# Patient Record
Sex: MALE | Race: WHITE | NOT HISPANIC OR LATINO | Employment: FULL TIME | URBAN - METROPOLITAN AREA
[De-identification: names, ages, dates, MRNs, and addresses within clinical notes are randomized per-mention and may not be internally consistent; named-entity substitution may affect disease eponyms.]

---

## 2018-02-06 ENCOUNTER — OFFICE VISIT (OUTPATIENT)
Dept: MEDICAL GROUP | Facility: MEDICAL CENTER | Age: 43
End: 2018-02-06
Payer: COMMERCIAL

## 2018-02-06 VITALS
SYSTOLIC BLOOD PRESSURE: 120 MMHG | TEMPERATURE: 98 F | HEIGHT: 70 IN | OXYGEN SATURATION: 97 % | BODY MASS INDEX: 28.78 KG/M2 | WEIGHT: 201.06 LBS | DIASTOLIC BLOOD PRESSURE: 78 MMHG | HEART RATE: 83 BPM | RESPIRATION RATE: 16 BRPM

## 2018-02-06 DIAGNOSIS — G89.29 CHRONIC BILATERAL LOW BACK PAIN WITHOUT SCIATICA: ICD-10-CM

## 2018-02-06 DIAGNOSIS — K21.9 GASTROESOPHAGEAL REFLUX DISEASE, ESOPHAGITIS PRESENCE NOT SPECIFIED: ICD-10-CM

## 2018-02-06 DIAGNOSIS — Z76.89 ENCOUNTER TO ESTABLISH CARE: ICD-10-CM

## 2018-02-06 DIAGNOSIS — G89.29 CHRONIC NECK PAIN: ICD-10-CM

## 2018-02-06 DIAGNOSIS — F41.9 ANXIETY: ICD-10-CM

## 2018-02-06 DIAGNOSIS — D22.9 ATYPICAL NEVUS: ICD-10-CM

## 2018-02-06 DIAGNOSIS — M54.50 CHRONIC BILATERAL LOW BACK PAIN WITHOUT SCIATICA: ICD-10-CM

## 2018-02-06 DIAGNOSIS — M54.2 CHRONIC NECK PAIN: ICD-10-CM

## 2018-02-06 PROCEDURE — 99204 OFFICE O/P NEW MOD 45 MIN: CPT | Performed by: PHYSICIAN ASSISTANT

## 2018-02-06 RX ORDER — ALPRAZOLAM 0.25 MG/1
0.25 TABLET ORAL NIGHTLY PRN
COMMUNITY
End: 2019-01-29 | Stop reason: SDUPTHER

## 2018-02-06 ASSESSMENT — PATIENT HEALTH QUESTIONNAIRE - PHQ9: CLINICAL INTERPRETATION OF PHQ2 SCORE: 0
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
## 2018-02-06 NOTE — ASSESSMENT & PLAN NOTE
Also from the [facility] developed low back pain. No sciatica. States he takes ibuprofen almost daily for symptoms. Like to be seen by a chiropractor in the future.

## 2018-02-06 NOTE — ASSESSMENT & PLAN NOTE
Has intermittent anxiety. Takes alprazolam 0.25 mg as needed. Does not need any medication renewal today. Has had a chronic history of anxiety.
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
## 2018-02-06 NOTE — ASSESSMENT & PLAN NOTE
This is a 42-year-old male who is here today to establish care and also to discuss among other things a suspicious lesion on his neck. On the left side. He doesn't know how long it's been there. He was informed last time by his PCP that he needed to see a dermatologist but was never offered the referral. He was also addressed at his work as he works for [employer] by a doctor told him to have it evaluated. He is no history of melanoma. No family history of melanoma.

## 2018-02-06 NOTE — ASSESSMENT & PLAN NOTE
For the fast 6 months has had reflux symptoms symptoms are worse at nighttime. He eats a large lunch but a small dinner. Does like spicy foods. Has not made significant lifestyle changes over the past few months to curb the reflux. He will take over-the-counter ranitidine for symptoms. It is effective.

## 2018-02-09 ENCOUNTER — OFFICE VISIT (OUTPATIENT)
Dept: DERMATOLOGY | Facility: IMAGING CENTER | Age: 43
End: 2018-02-09
Payer: COMMERCIAL

## 2018-02-09 ENCOUNTER — HOSPITAL ENCOUNTER (OUTPATIENT)
Facility: MEDICAL CENTER | Age: 43
End: 2018-02-09
Attending: NURSE PRACTITIONER
Payer: COMMERCIAL

## 2018-02-09 DIAGNOSIS — D48.5 NEOPLASM OF UNCERTAIN BEHAVIOR OF SKIN OF NECK: ICD-10-CM

## 2018-02-09 PROCEDURE — 88305 TISSUE EXAM BY PATHOLOGIST: CPT

## 2018-02-09 PROCEDURE — 11100 PR BIOPSY OF SKIN LESION: CPT | Performed by: NURSE PRACTITIONER

## 2018-02-09 NOTE — PROGRESS NOTES
Chief Complaint   Patient presents with   • Skin Lesion         HISTORY OF THE PRESENT ILLNESS: Patient is a 42 y.o. male. His primary care provider is Bello Denise. This pleasant patient is here today regarding a skin lesion on his neck.      Neoplasm of uncertain behavior of skin of neck  He has had a pigmented lesion on his neck for at least 8 months. It was noticed by someone else. 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 His primary doctor has been concerned.       Allergies: Patient has no known allergies.    Current Outpatient Prescriptions   Medication Sig Dispense Refill   • ALPRAZolam (XANAX) 0.25 MG Tab Take 0.25 mg by mouth at bedtime as needed for Sleep.       No current facility-administered medications for this visit.        Past Medical History:   Diagnosis Date   • ASTHMA    • Insomnia    • Neoplasm of uncertain behavior of skin of neck 2/9/2018       Past Surgical History:   Procedure Laterality Date   • MANDIBLE REDUCTION CLOSED         Social History   Substance Use Topics   • Smoking status: Never Smoker   • Smokeless tobacco: Never Used   • Alcohol use 1.0 oz/week     2 Cans of beer per week      Comment: 2 drinks per day       Family Status   Relation Status   • Mother Alive   • Father Alive     Family History   Problem Relation Age of Onset   • Hyperlipidemia Father    • Hypertension Father    • Heart Disease Father        Review of Systems   Constitutional: Negative for fever, chills, weight loss and malaise/fatigue.   Skin: Skin lesion on his neck  Exam: There were no vitals taken for this visit.  General: Normal appearing. No distress.  Skin: Left lateral neck, 3-4 mm dark brown lesion with light brown halo surrounding.  Patient consents to biopsy.  Consent is signed     Preoperative diagnosis: Neoplasm of uncertain behavior skin of the neck  Postoperative diagnosis: Neoplasm of uncertain behavior skin of the neck          Anesthesia: Half to 1 cc of lidocaine with epinephrine    EBL: Less than 2  cc    Complications: None    Procedure: After informed consent patient was placed on the table supine. An area 2 cm superior lateral to the skin neoplasm was swabbed with iodine. Using a 30 gauge 1/2 inch needle local anesthesia was obtained with half cc of lidocaine with epinephrine.  Using a #3 punch a punch biopsy was performed, lesion was sent to pathology. The number four Prolene suture was placed.   Patient tolerated the procedure well . Antibacterial ointment and a sterile dressing was applied, patient was instructed on wound care. Patient was instructed on signs and symptoms of infection. He will call the office if these occur.          Please note that this dictation was created using voice recognition software. I have made every reasonable attempt to correct obvious errors, but I expect that there are errors of grammar and possibly content that I did not discover before finalizing the note.      Assessment/Plan  1. Neoplasm of uncertain behavior of skin of neck  Further treatment based on pathology results

## 2018-02-09 NOTE — ASSESSMENT & PLAN NOTE
He has had a pigmented lesion on his neck for at least 8 months. It was noticed by someone else. His primary doctor has been concerned.

## 2018-05-08 ENCOUNTER — APPOINTMENT (OUTPATIENT)
Dept: MEDICAL GROUP | Facility: MEDICAL CENTER | Age: 43
End: 2018-05-08

## 2018-05-22 ENCOUNTER — OFFICE VISIT (OUTPATIENT)
Dept: URGENT CARE | Facility: CLINIC | Age: 43
End: 2018-05-22

## 2018-05-22 ENCOUNTER — NON-PROVIDER VISIT (OUTPATIENT)
Dept: URGENT CARE | Facility: CLINIC | Age: 43
End: 2018-05-22

## 2018-05-22 DIAGNOSIS — Z02.1 PRE-EMPLOYMENT EXAMINATION: ICD-10-CM

## 2018-05-22 PROCEDURE — 8915 PR COMPREHENSIVE PHYSICAL: Performed by: NURSE PRACTITIONER

## 2018-05-22 PROCEDURE — 97750 PHYSICAL PERFORMANCE TEST: CPT | Performed by: NURSE PRACTITIONER

## 2018-05-22 ASSESSMENT — ENCOUNTER SYMPTOMS
BACK PAIN: 0
FEVER: 0
WHEEZING: 0
HEMOPTYSIS: 0
DIAPHORESIS: 0
FALLS: 0
NECK PAIN: 0
COUGH: 0
DIZZINESS: 0
CHILLS: 0
PALPITATIONS: 0
WEAKNESS: 0
SHORTNESS OF BREATH: 0
ORTHOPNEA: 0
MYALGIAS: 0
SPUTUM PRODUCTION: 0

## 2018-05-22 NOTE — PROGRESS NOTES
Subjective:      Genaro Kunz is a 43 y.o. male who presents with No chief complaint on file.            Patient comes in today for a pre-employment physical.  He reports he is in general good health and does not anticipate any difficulty performing expected job duties.  See attached history form.  He takes Xanax prn for anxiety but not when at work.          Review of Systems   Constitutional: Negative for chills, diaphoresis, fever and malaise/fatigue.   Respiratory: Negative for cough, hemoptysis, sputum production, shortness of breath and wheezing.    Cardiovascular: Negative for chest pain, palpitations, orthopnea and leg swelling.   Musculoskeletal: Negative for back pain, falls, joint pain, myalgias and neck pain.   Neurological: Negative for dizziness and weakness.     Medications, Allergies, and current problem list reviewed today in Epic     Objective:     There were no vitals taken for this visit.     Physical Exam   Constitutional: He is oriented to person, place, and time. He appears well-developed and well-nourished. No distress.   HENT:   Head: Normocephalic.   Right Ear: External ear normal.   Left Ear: External ear normal.   Nose: Nose normal.   Mouth/Throat: Oropharynx is clear and moist. No oropharyngeal exudate.   Eyes: Conjunctivae and EOM are normal. Pupils are equal, round, and reactive to light. Right eye exhibits no discharge. Left eye exhibits no discharge. No scleral icterus.   Neck: Normal range of motion. Neck supple. No JVD present. No tracheal deviation present. No thyromegaly present.   Cardiovascular: Normal rate, regular rhythm and normal heart sounds.  Exam reveals no gallop and no friction rub.    No murmur heard.  Pulmonary/Chest: Effort normal and breath sounds normal. No stridor. No respiratory distress. He has no wheezes. He has no rales. He exhibits no tenderness.   Abdominal: Soft. Bowel sounds are normal. He exhibits no distension and no mass. There is no  tenderness. There is no rebound and no guarding. No hernia. Hernia confirmed negative in the right inguinal area and confirmed negative in the left inguinal area.   Genitourinary: Testes normal.   Genitourinary Comments: No inguinal hernia.     Musculoskeletal: Normal range of motion. He exhibits no edema or tenderness.   Lymphadenopathy:     He has no cervical adenopathy.   Neurological: He is alert and oriented to person, place, and time. No cranial nerve deficit. Coordination normal.   Skin: Skin is warm and dry. No rash noted. He is not diaphoretic. No erythema.   Psychiatric: He has a normal mood and affect. His behavior is normal. Judgment and thought content normal.   Vitals reviewed.    -see scanned form.          Assessment/Plan:     1. 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  Cleared for work with no restrictions.  Follow up with PCP for routine health care, screening, and maintenance.  Patient verbalized understanding of and agreed with plan of care.

## 2019-01-29 ENCOUNTER — OFFICE VISIT (OUTPATIENT)
Dept: MEDICAL GROUP | Facility: MEDICAL CENTER | Age: 44
End: 2019-01-29
Payer: COMMERCIAL

## 2019-01-29 VITALS
BODY MASS INDEX: 28.09 KG/M2 | TEMPERATURE: 97.6 F | HEIGHT: 70 IN | SYSTOLIC BLOOD PRESSURE: 130 MMHG | DIASTOLIC BLOOD PRESSURE: 80 MMHG | RESPIRATION RATE: 16 BRPM | OXYGEN SATURATION: 96 % | HEART RATE: 76 BPM | WEIGHT: 196.21 LBS

## 2019-01-29 DIAGNOSIS — M54.2 CHRONIC NECK PAIN: ICD-10-CM

## 2019-01-29 DIAGNOSIS — G89.29 CHRONIC NECK PAIN: ICD-10-CM

## 2019-01-29 DIAGNOSIS — R79.89 LOW TESTOSTERONE: ICD-10-CM

## 2019-01-29 DIAGNOSIS — M25.511 CHRONIC RIGHT SHOULDER PAIN: ICD-10-CM

## 2019-01-29 DIAGNOSIS — F51.01 PRIMARY INSOMNIA: ICD-10-CM

## 2019-01-29 DIAGNOSIS — G89.29 CHRONIC RIGHT SHOULDER PAIN: ICD-10-CM

## 2019-01-29 DIAGNOSIS — F41.9 ANXIETY: ICD-10-CM

## 2019-01-29 DIAGNOSIS — G89.29 CHRONIC BILATERAL LOW BACK PAIN WITHOUT SCIATICA: ICD-10-CM

## 2019-01-29 DIAGNOSIS — M54.50 CHRONIC BILATERAL LOW BACK PAIN WITHOUT SCIATICA: ICD-10-CM

## 2019-01-29 PROCEDURE — 99214 OFFICE O/P EST MOD 30 MIN: CPT | Performed by: PHYSICIAN ASSISTANT

## 2019-01-29 RX ORDER — ZOLPIDEM TARTRATE 5 MG/1
5 TABLET ORAL NIGHTLY PRN
Qty: 30 TAB | Refills: 0 | Status: SHIPPED | OUTPATIENT
Start: 2019-01-29 | End: 2019-03-11 | Stop reason: SDUPTHER

## 2019-01-29 RX ORDER — ALPRAZOLAM 0.25 MG/1
0.25 TABLET ORAL
Qty: 30 TAB | Refills: 0 | Status: SHIPPED | OUTPATIENT
Start: 2019-01-29 | End: 2019-04-19 | Stop reason: SDUPTHER

## 2019-01-29 ASSESSMENT — PATIENT HEALTH QUESTIONNAIRE - PHQ9: CLINICAL INTERPRETATION OF PHQ2 SCORE: 0

## 2019-01-29 NOTE — ASSESSMENT & PLAN NOTE
Complains of chronic neck and back pain.  In the past I offered to refer him to chiropractic services but he declined.

## 2019-01-29 NOTE — PROGRESS NOTES
Subjective:   Genaro Kunz is a 43 y.o. male here today for low testosterone, insomnia, anxiety, chronic right shoulder pain and chronic back pain.    Low testosterone  This is a 43-year-old male who complains of some problems possibly with low testosterone.  Has friends were at the same concerns.  Erectile concerns at times.  Difficulty with energy.    Primary insomnia  Has chronic insomnia.  In the past took Ambien for many years.  Requesting a refill.  Currently working nights at [employer].  Difficult to fall asleep using Ambien and over-the-counter medications.    Anxiety  Has a history of intermittent anxiety.  In the past was given 0.25 mg of Xanax.  Medications are effective when he needs it.  Usually anxiety is in social settings.    Chronic right shoulder pain  Has chronic right shoulder pain secondary to a fracture.  Continues to do with pain.  Shoulder also feels unstable.  Would like to see an orthopedic specialist.    Chronic bilateral low back pain without sciatica  Complains of chronic neck and back pain.  In the past I offered to refer him to chiropractic services but he declined.       Current medicines (including changes today)  Current Outpatient Prescriptions   Medication Sig Dispense Refill   • ALPRAZolam (XANAX) 0.25 MG Tab Take 1 Tab by mouth 1 time daily as needed for Anxiety for up to 30 days. 30 Tab 0   • zolpidem (AMBIEN) 5 MG Tab Take 1 Tab by mouth at bedtime as needed for Sleep for up to 30 days. 30 Tab 0     No current facility-administered medications for this visit.      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    Social History and Family History were reviewed and updated.    ROS   No chest pain, no shortness of breath, no abdominal pain and all other systems were reviewed and are negative.       Objective:     Blood pressure 130/80, pulse 76, temperature 36.4 °C (97.6 °F), temperature source Temporal,  "resp. rate 16, height 1.79 m (5' 10.47\"), weight 89 kg (196 lb 3.4 oz), SpO2 96 %. Body mass index is 27.78 kg/m².   Physical Exam:  Constitutional: Alert, no distress.  Skin: Warm, dry, good turgor, no rashes in visible areas.  Eye: Equal, round and reactive, conjunctiva clear, lids normal.  ENMT: Lips without lesions, good dentition, oropharynx clear.  Neck: Trachea midline, no masses.   Lymph: No cervical or supraclavicular lymphadenopathy  Respiratory: Unlabored respiratory effort, lungs clear to auscultation, no wheezes, no ronchi.  Cardiovascular: Normal S1, S2, no murmur, no edema.  Abdomen: Soft, non-tender, no masses.  Psych: Alert and oriented x3, normal affect and mood.        Assessment and Plan:   The following treatment plan was discussed    1. Low testosterone  New onset condition.  Order testosterone profile.  - TESTOSTERONE F&T MALE ADULT; Future    2. Anxiety  Chronic condition.   reviewed.  Medication appropriate.  Renewed Xanax 0.25 mg take as needed.  - ALPRAZolam (XANAX) 0.25 MG Tab; Take 1 Tab by mouth 1 time daily as needed for Anxiety for up to 30 days.  Dispense: 30 Tab; Refill: 0    3. Primary insomnia  Chronic condition.  Discussed side effects of Ambien.  Provided 5 mg tablets.  Take as needed.  - zolpidem (AMBIEN) 5 MG Tab; Take 1 Tab by mouth at bedtime as needed for Sleep for up to 30 days.  Dispense: 30 Tab; Refill: 0    4. Chronic right shoulder pain  Chronic condition.  New condition noted in chart.  Referred to chiropractic services.  - REFERRAL TO ORTHOPEDICS    5. Chronic bilateral low back pain without sciatica  Chronic condition.  Likely musculoskeletal.  Referred to chiropractic services.  - REFERRAL TO CHIROPRACTIC    6. Chronic neck pain  Chronic condition.  Likely musculoskeletal.  Referred to chiropractic services.  - REFERRAL TO CHIROPRACTIC      Followup: Return in about 4 weeks (around 2/26/2019).    Please note that this dictation was created using voice " recognition software. I have made every reasonable attempt to correct obvious errors, but I expect that there are errors of grammar and possibly content that I did not discover before finalizing the note.

## 2019-01-29 NOTE — ASSESSMENT & PLAN NOTE
This is a 43-year-old male who complains of some problems possibly with low testosterone.  Has friends were at the same concerns.  Erectile concerns at times.  Difficulty with energy.

## 2019-01-29 NOTE — ASSESSMENT & PLAN NOTE
Has chronic insomnia.  In the past took Ambien for many years.  Requesting a refill.  Currently working nights at [employer].  Difficult to fall asleep using Ambien and over-the-counter medications.

## 2019-01-29 NOTE — ASSESSMENT & PLAN NOTE
Has a history of intermittent anxiety.  In the past was given 0.25 mg of Xanax.  Medications are effective when he needs it.  Usually anxiety is in social settings.

## 2019-01-29 NOTE — ASSESSMENT & PLAN NOTE
Has chronic right shoulder pain secondary to a fracture.  Continues to do with pain.  Shoulder also feels unstable.  Would like to see an orthopedic specialist.

## 2019-02-12 ENCOUNTER — OFFICE VISIT (OUTPATIENT)
Dept: MEDICAL GROUP | Facility: MEDICAL CENTER | Age: 44
End: 2019-02-12
Payer: COMMERCIAL

## 2019-02-12 ENCOUNTER — HOSPITAL ENCOUNTER (OUTPATIENT)
Dept: LAB | Facility: MEDICAL CENTER | Age: 44
End: 2019-02-12
Attending: PHYSICIAN ASSISTANT
Payer: COMMERCIAL

## 2019-02-12 VITALS
SYSTOLIC BLOOD PRESSURE: 128 MMHG | BODY MASS INDEX: 28.22 KG/M2 | DIASTOLIC BLOOD PRESSURE: 72 MMHG | OXYGEN SATURATION: 94 % | HEART RATE: 58 BPM | TEMPERATURE: 97.8 F | HEIGHT: 70 IN | WEIGHT: 197.09 LBS

## 2019-02-12 DIAGNOSIS — R79.89 LOW TESTOSTERONE: ICD-10-CM

## 2019-02-12 DIAGNOSIS — R68.89 FLU-LIKE SYMPTOMS: ICD-10-CM

## 2019-02-12 DIAGNOSIS — J10.1 INFLUENZA A: ICD-10-CM

## 2019-02-12 LAB
FLUAV+FLUBV AG SPEC QL IA: NEGATIVE
INT CON NEG: NEGATIVE
INT CON POS: POSITIVE

## 2019-02-12 PROCEDURE — 87804 INFLUENZA ASSAY W/OPTIC: CPT | Performed by: PHYSICIAN ASSISTANT

## 2019-02-12 PROCEDURE — 84270 ASSAY OF SEX HORMONE GLOBUL: CPT

## 2019-02-12 PROCEDURE — 36415 COLL VENOUS BLD VENIPUNCTURE: CPT

## 2019-02-12 PROCEDURE — 99214 OFFICE O/P EST MOD 30 MIN: CPT | Performed by: PHYSICIAN ASSISTANT

## 2019-02-12 PROCEDURE — 84403 ASSAY OF TOTAL TESTOSTERONE: CPT

## 2019-02-12 RX ORDER — PROMETHAZINE HYDROCHLORIDE AND CODEINE PHOSPHATE 6.25; 1 MG/5ML; MG/5ML
5 SYRUP ORAL NIGHTLY PRN
Qty: 160 ML | Refills: 0 | Status: SHIPPED | OUTPATIENT
Start: 2019-02-12 | End: 2019-03-11

## 2019-02-12 RX ORDER — OSELTAMIVIR PHOSPHATE 75 MG/1
75 CAPSULE ORAL 2 TIMES DAILY
Qty: 10 CAP | Refills: 0 | Status: SHIPPED | OUTPATIENT
Start: 2019-02-12 | End: 2019-03-11

## 2019-02-12 NOTE — PROGRESS NOTES
"Subjective:   Genaro Kunz is a 43 y.o. male here today for flulike symptoms.    Influenza A  This is a 43-year-old male who complains of a 2-day since yesterday of having flulike symptoms.  Complains of body aches and chills.  No fever.  Also associated coughing.  Nasal congestion.  Denies any sore throat.  No chest pain or shortness of breath.  Her daughter was diagnosed today with influenza A.  She is been sick for 3 days.  No other known sick contacts.  He is not currently taking any medications.  Has no significant chronic medical conditions.  Today he did get his lab performed for testosterone profile.      Current medicines (including changes today)  Current Outpatient Prescriptions   Medication Sig Dispense Refill   • oseltamivir (TAMIFLU) 75 MG Cap Take 1 Cap by mouth 2 times a day. 10 Cap 0   • promethazine-codeine (PHENERGAN-CODEINE) 6.25-10 MG/5ML Syrup Take 5 mL by mouth at bedtime as needed for up to 30 days. 160 mL 0   • ALPRAZolam (XANAX) 0.25 MG Tab Take 1 Tab by mouth 1 time daily as needed for Anxiety for up to 30 days. 30 Tab 0   • zolpidem (AMBIEN) 5 MG Tab Take 1 Tab by mouth at bedtime as needed for Sleep for up to 30 days. 30 Tab 0     No current facility-administered medications for this visit.      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    Social History and Family History were reviewed and updated.    ROS   No chest pain, no shortness of breath, no abdominal pain and all other systems were reviewed and are negative.       Objective:     Blood pressure 128/72, pulse (!) 58, temperature 36.6 °C (97.8 °F), temperature source Temporal, height 1.79 m (5' 10.47\"), weight 89.4 kg (197 lb 1.5 oz), SpO2 94 %. Body mass index is 27.9 kg/m².   Physical Exam:  Constitutional: Alert, no distress.  Skin: Warm, dry, good turgor, no rashes in visible areas.  Eye: Equal, round and reactive, conjunctiva clear, lids " normal.  ENMT: Lips without lesions, good dentition, oropharynx clear.  Neck: Trachea midline, no masses.   Lymph: No cervical or supraclavicular lymphadenopathy  Respiratory: Unlabored respiratory effort, lungs clear to auscultation, no wheezes, no ronchi.  Cardiovascular: Normal S1, S2, no murmur, no edema.  Psych: Alert and oriented x3, normal affect and mood.    Rapid influenza testing negative.    Assessment and Plan:   The following treatment plan was discussed    1. Flu-like symptoms  Acute, new onset condition.  Discussed viral process.  Push fluids.  Consider over-the-counter cold medications symptoms may last for 2-3 weeks.  Follow-up with any worsening symptoms such as fever, shortness of breath or chest pain.  Provided a cough syrup to take at nighttime only.  Do not drink drive.   reviewed.  Sent over a prescription for Tamiflu but influenza testing was negative.  - oseltamivir (TAMIFLU) 75 MG Cap; Take 1 Cap by mouth 2 times a day.  Dispense: 10 Cap; Refill: 0  - promethazine-codeine (PHENERGAN-CODEINE) 6.25-10 MG/5ML Syrup; Take 5 mL by mouth at bedtime as needed for up to 30 days.  Dispense: 160 mL; Refill: 0  - POCT Influenza A/B      Followup: Return if symptoms worsen or fail to improve.    Please note that this dictation was created using voice recognition software. I have made every reasonable attempt to correct obvious errors, but I expect that there are errors of grammar and possibly content that I did not discover before finalizing the note.

## 2019-02-12 NOTE — ASSESSMENT & PLAN NOTE
This is a 43-year-old male who complains of a 2-day since yesterday of having flulike symptoms.  Complains of body aches and chills.  No fever.  Also associated coughing.  Nasal congestion.  Denies any sore throat.  No chest pain or shortness of breath.  Her daughter was diagnosed today with influenza A.  She is been sick for 3 days.  No other known sick contacts.  He is not currently taking any medications.  Has no significant chronic medical conditions.  Today he did get his lab performed for testosterone profile.

## 2019-02-15 ENCOUNTER — OFFICE VISIT (OUTPATIENT)
Dept: URGENT CARE | Facility: CLINIC | Age: 44
End: 2019-02-15
Payer: COMMERCIAL

## 2019-02-15 VITALS
BODY MASS INDEX: 28.06 KG/M2 | HEIGHT: 70 IN | HEART RATE: 80 BPM | WEIGHT: 196 LBS | OXYGEN SATURATION: 97 % | TEMPERATURE: 98.2 F | DIASTOLIC BLOOD PRESSURE: 88 MMHG | SYSTOLIC BLOOD PRESSURE: 134 MMHG

## 2019-02-15 DIAGNOSIS — J02.9 PHARYNGITIS, UNSPECIFIED ETIOLOGY: ICD-10-CM

## 2019-02-15 DIAGNOSIS — Z20.818 EXPOSURE TO STREP THROAT: ICD-10-CM

## 2019-02-15 DIAGNOSIS — R79.89 LOW TESTOSTERONE: ICD-10-CM

## 2019-02-15 LAB
INT CON NEG: NEGATIVE
INT CON POS: NEGATIVE
S PYO AG THROAT QL: NEGATIVE
SHBG SERPL-SCNC: 28 NMOL/L (ref 11–80)
TESTOST FREE MFR SERPL: 1.9 % (ref 1.6–2.9)
TESTOST FREE SERPL-MCNC: 48 PG/ML (ref 47–244)
TESTOST SERPL-MCNC: 247 NG/DL (ref 300–890)

## 2019-02-15 PROCEDURE — 99214 OFFICE O/P EST MOD 30 MIN: CPT | Performed by: PHYSICIAN ASSISTANT

## 2019-02-15 PROCEDURE — 87880 STREP A ASSAY W/OPTIC: CPT | Performed by: PHYSICIAN ASSISTANT

## 2019-02-15 RX ORDER — AMOXICILLIN 500 MG/1
500 CAPSULE ORAL 2 TIMES DAILY
Qty: 20 CAP | Refills: 0 | Status: SHIPPED | OUTPATIENT
Start: 2019-02-15 | End: 2019-02-25

## 2019-02-15 ASSESSMENT — ENCOUNTER SYMPTOMS
DIZZINESS: 0
HEADACHES: 1
FEVER: 1
SHORTNESS OF BREATH: 0
NAUSEA: 0
DIARRHEA: 0
MYALGIAS: 1
PALPITATIONS: 0
SWOLLEN GLANDS: 1
SORE THROAT: 1
VOMITING: 0
COUGH: 1
SINUS PAIN: 0
EYE PAIN: 0
ABDOMINAL PAIN: 0
BLURRED VISION: 0
TROUBLE SWALLOWING: 1
CHILLS: 1

## 2019-02-15 NOTE — PROGRESS NOTES
Subjective:      Genaro Kunz is a 43 y.o. male who presents with Pharyngitis; Generalized Body Aches; and Cough      Pharyngitis    This is a new problem. The current episode started yesterday. The problem has been gradually worsening. Neither side of throat is experiencing more pain than the other. The pain is moderate. Associated symptoms include congestion, coughing, headaches, a plugged ear sensation, swollen glands and trouble swallowing. Pertinent negatives include no abdominal pain, diarrhea, drooling, ear pain, shortness of breath or vomiting. He has had exposure to strep. Exposure to: His daughter. He has tried NSAIDs and acetaminophen (Tamiflu) for the symptoms. The treatment provided no relief.   Patient was diagnosed with influenza A on 2/12/2019 and was prescribed Tamiflu.  He states that his daughter also had the flu in the past few days she had re-diagnosed with strep throat.  He states that today his throat started to hurt pretty severely he is concerned that she may have given him strep.      Review of Systems   Constitutional: Positive for chills, fever and malaise/fatigue.   HENT: Positive for congestion, sore throat and trouble swallowing. Negative for drooling, ear pain and sinus pain.    Eyes: Negative for blurred vision and pain.   Respiratory: Positive for cough. Negative for shortness of breath.    Cardiovascular: Negative for chest pain and palpitations.   Gastrointestinal: Negative for abdominal pain, diarrhea, nausea and vomiting.   Musculoskeletal: Positive for myalgias.   Skin: Negative for rash.   Neurological: Positive for headaches. Negative for dizziness.       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  MEDS:   Current Outpatient Prescriptions:   •  amoxicillin (AMOXIL) 500 MG Cap, Take 1 Cap by mouth 2 times a day for 10 days., Disp: 20 Cap, Rfl: 0  •  oseltamivir (TAMIFLU) 75 MG Cap, Take 1 Cap by  "mouth 2 times a day., Disp: 10 Cap, Rfl: 0  •  promethazine-codeine (PHENERGAN-CODEINE) 6.25-10 MG/5ML Syrup, Take 5 mL by mouth at bedtime as needed for up to 30 days., Disp: 160 mL, Rfl: 0  •  zolpidem (AMBIEN) 5 MG Tab, Take 1 Tab by mouth at bedtime as needed for Sleep for up to 30 days., Disp: 30 Tab, Rfl: 0  •  ALPRAZolam (XANAX) 0.25 MG Tab, Take 1 Tab by mouth 1 time daily as needed for Anxiety for up to 30 days. (Patient not taking: Reported on 2/15/2019), Disp: 30 Tab, Rfl: 0  ALLERGIES: No Known Allergies  SURGHX:   Past Surgical History:   Procedure Laterality Date   • MANDIBLE REDUCTION CLOSED       SOCHX:  reports that he has never smoked. He has never used smokeless tobacco. He reports that he drinks about 1.0 oz of alcohol per week . He reports that he does not use drugs.  FH: Family history was reviewed, no pertinent findings to report     Objective:     /88 (BP Location: Left arm, Patient Position: Sitting, BP Cuff Size: Adult)   Pulse 80   Temp 36.8 °C (98.2 °F) (Temporal)   Ht 1.778 m (5' 10\")   Wt 88.9 kg (196 lb)   SpO2 97%   BMI 28.12 kg/m²      Physical Exam   Constitutional: He is oriented to person, place, and time. He appears well-developed and well-nourished.   HENT:   Head: Normocephalic and atraumatic.   Right Ear: Tympanic membrane, external ear and ear canal normal.   Left Ear: Tympanic membrane, external ear and ear canal normal.   Nose: Mucosal edema and rhinorrhea present. Right sinus exhibits no maxillary sinus tenderness and no frontal sinus tenderness. Left sinus exhibits no maxillary sinus tenderness and no frontal sinus tenderness.   Mouth/Throat: Uvula is midline and mucous membranes are normal. Posterior oropharyngeal erythema present. No tonsillar exudate.   Eyes: Pupils are equal, round, and reactive to light. Conjunctivae are normal.   Neck: Normal range of motion.   Cardiovascular: Normal rate, regular rhythm and normal heart sounds.    No murmur " heard.  Pulmonary/Chest: Effort normal and breath sounds normal. He has no wheezes.   Lymphadenopathy:     He has cervical adenopathy.   Neurological: He is alert and oriented to person, place, and time.   Skin: Skin is warm and dry. Capillary refill takes less than 2 seconds.   Psychiatric: He has a normal mood and affect. His behavior is normal. Judgment normal.   Vitals reviewed.      POCT Rapid Strep A - Negative     Assessment/Plan:     1. Pharyngitis, unspecified etiology  - POCT Rapid Strep A  - amoxicillin (AMOXIL) 500 MG Cap; Take 1 Cap by mouth 2 times a day for 10 days.  Dispense: 20 Cap; Refill: 0    2. Exposure to strep throat  - POCT Rapid Strep A  - amoxicillin (AMOXIL) 500 MG Cap; Take 1 Cap by mouth 2 times a day for 10 days.  Dispense: 20 Cap; Refill: 0        Differential Diagnosis, natural history, and supportive care discussed. Return to the Urgent Care or follow up with your PCP if symptoms fail to resolve, or for any new or worsening symptoms. Emergency room precautions discussed. Patient and/or family appears understanding of information.

## 2019-03-11 ENCOUNTER — OFFICE VISIT (OUTPATIENT)
Dept: MEDICAL GROUP | Facility: MEDICAL CENTER | Age: 44
End: 2019-03-11
Payer: COMMERCIAL

## 2019-03-11 VITALS
TEMPERATURE: 97.9 F | OXYGEN SATURATION: 94 % | WEIGHT: 194.67 LBS | HEART RATE: 74 BPM | DIASTOLIC BLOOD PRESSURE: 80 MMHG | HEIGHT: 70 IN | BODY MASS INDEX: 27.87 KG/M2 | SYSTOLIC BLOOD PRESSURE: 118 MMHG

## 2019-03-11 DIAGNOSIS — Z00.00 PREVENTATIVE HEALTH CARE: ICD-10-CM

## 2019-03-11 DIAGNOSIS — R79.89 LOW TESTOSTERONE: ICD-10-CM

## 2019-03-11 DIAGNOSIS — F51.01 PRIMARY INSOMNIA: ICD-10-CM

## 2019-03-11 PROBLEM — D22.9 ATYPICAL NEVUS: Status: RESOLVED | Noted: 2018-02-06 | Resolved: 2019-03-11

## 2019-03-11 PROBLEM — J10.1 INFLUENZA A: Status: RESOLVED | Noted: 2019-02-12 | Resolved: 2019-03-11

## 2019-03-11 PROCEDURE — 99214 OFFICE O/P EST MOD 30 MIN: CPT | Performed by: PHYSICIAN ASSISTANT

## 2019-03-11 RX ORDER — ZOLPIDEM TARTRATE 5 MG/1
5 TABLET ORAL NIGHTLY PRN
COMMUNITY
End: 2019-03-11

## 2019-03-11 RX ORDER — ZOLPIDEM TARTRATE 5 MG/1
5 TABLET ORAL NIGHTLY PRN
Qty: 30 TAB | Refills: 2 | Status: SHIPPED | OUTPATIENT
Start: 2019-03-11 | End: 2019-06-03 | Stop reason: SDUPTHER

## 2019-03-11 NOTE — ASSESSMENT & PLAN NOTE
This is a 43-year-old male who is here today to discuss insomnia.  Requesting a refill today of Ambien.  Takes 5 mg prior to sleep.  Medication is effective.  Has a few tablets left.  Requesting a refill.

## 2019-03-11 NOTE — ASSESSMENT & PLAN NOTE
Testosterone recently was low.  In the 240s.  History of low testosterone.  He complains of erectile concerns as well as fatigue.

## 2019-03-11 NOTE — PROGRESS NOTES
"Subjective:   Genaro Kunz is a 43 y.o. male here today for insomnia and low testosterone.    Primary insomnia  This is a 43-year-old male who is here today to discuss insomnia.  Requesting a refill today of Ambien.  Takes 5 mg prior to sleep.  Medication is effective.  Has a few tablets left.  Requesting a refill.    Low testosterone  Testosterone recently was low.  In the 240s.  History of low testosterone.  He complains of erectile concerns as well as fatigue.       Current medicines (including changes today)  Current Outpatient Prescriptions   Medication Sig Dispense Refill   • zolpidem (AMBIEN) 5 MG Tab Take 1 Tab by mouth at bedtime as needed for Sleep for up to 30 days. 30 Tab 2     No current facility-administered medications for this visit.      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    Social History and Family History were reviewed and updated.    ROS   No chest pain, no shortness of breath, no abdominal pain and all other systems were reviewed and are negative.       Objective:     Blood pressure 118/80, pulse 74, temperature 36.6 °C (97.9 °F), temperature source Temporal, height 1.778 m (5' 10\"), weight 88.3 kg (194 lb 10.7 oz), SpO2 94 %. Body mass index is 27.93 kg/m².   Physical Exam:  Constitutional: Alert, no distress.  Skin: Warm, dry, good turgor, no rashes in visible areas.  Eye: Equal, round and reactive, conjunctiva clear, lids normal.  ENMT: Lips without lesions, good dentition, oropharynx clear.  Neck: Trachea midline, no masses.   Lymph: No cervical or supraclavicular lymphadenopathy  Respiratory: Unlabored respiratory effort, lungs appear clear, no wheezes.  Cardiovascular: Regular rate and rhythm.  Psych: Alert and oriented x3, normal affect and mood.        Assessment and Plan:   The following treatment plan was discussed    1. Primary insomnia  Chronic condition.  Stable.   reviewed.  Medication " appropriate.  Renewed Ambien 5 mg.  Provided a 90-day supply.  Must follow-up in 30 days to see me for refills.  - zolpidem (AMBIEN) 5 MG Tab; Take 1 Tab by mouth at bedtime as needed for Sleep for up to 30 days.  Dispense: 30 Tab; Refill: 2    2. Preventative health care  Ordered labs as far as prevention.  Fast.  - Lipid Profile; Future  - Comp Metabolic Panel; Future    3. Low testosterone  New onset condition.  Order testosterone profile repeat today or tomorrow.  Discussed starting testosterone therapy.  Will await labs.      Followup: Return in about 3 months (around 6/11/2019).    Please note that this dictation was created using voice recognition software. I have made every reasonable attempt to correct obvious errors, but I expect that there are errors of grammar and possibly content that I did not discover before finalizing the note.

## 2019-03-12 ENCOUNTER — HOSPITAL ENCOUNTER (OUTPATIENT)
Dept: LAB | Facility: MEDICAL CENTER | Age: 44
End: 2019-03-12
Attending: PHYSICIAN ASSISTANT
Payer: COMMERCIAL

## 2019-03-12 DIAGNOSIS — R79.89 LOW TESTOSTERONE: ICD-10-CM

## 2019-03-12 DIAGNOSIS — Z00.00 PREVENTATIVE HEALTH CARE: ICD-10-CM

## 2019-03-12 LAB
ALBUMIN SERPL BCP-MCNC: 4.2 G/DL (ref 3.2–4.9)
ALBUMIN/GLOB SERPL: 1.6 G/DL
ALP SERPL-CCNC: 54 U/L (ref 30–99)
ALT SERPL-CCNC: 14 U/L (ref 2–50)
ANION GAP SERPL CALC-SCNC: 8 MMOL/L (ref 0–11.9)
AST SERPL-CCNC: 16 U/L (ref 12–45)
BILIRUB SERPL-MCNC: 0.9 MG/DL (ref 0.1–1.5)
BUN SERPL-MCNC: 12 MG/DL (ref 8–22)
CALCIUM SERPL-MCNC: 9.4 MG/DL (ref 8.5–10.5)
CHLORIDE SERPL-SCNC: 106 MMOL/L (ref 96–112)
CHOLEST SERPL-MCNC: 210 MG/DL (ref 100–199)
CO2 SERPL-SCNC: 26 MMOL/L (ref 20–33)
CREAT SERPL-MCNC: 0.85 MG/DL (ref 0.5–1.4)
FASTING STATUS PATIENT QL REPORTED: NORMAL
GLOBULIN SER CALC-MCNC: 2.7 G/DL (ref 1.9–3.5)
GLUCOSE SERPL-MCNC: 95 MG/DL (ref 65–99)
HDLC SERPL-MCNC: 57 MG/DL
LDLC SERPL CALC-MCNC: 139 MG/DL
POTASSIUM SERPL-SCNC: 4.1 MMOL/L (ref 3.6–5.5)
PROT SERPL-MCNC: 6.9 G/DL (ref 6–8.2)
SODIUM SERPL-SCNC: 140 MMOL/L (ref 135–145)
TRIGL SERPL-MCNC: 71 MG/DL (ref 0–149)

## 2019-03-12 PROCEDURE — 80061 LIPID PANEL: CPT

## 2019-03-12 PROCEDURE — 36415 COLL VENOUS BLD VENIPUNCTURE: CPT

## 2019-03-12 PROCEDURE — 84403 ASSAY OF TOTAL TESTOSTERONE: CPT

## 2019-03-12 PROCEDURE — 80053 COMPREHEN METABOLIC PANEL: CPT

## 2019-03-12 PROCEDURE — 84270 ASSAY OF SEX HORMONE GLOBUL: CPT

## 2019-03-14 LAB
SHBG SERPL-SCNC: 30 NMOL/L (ref 11–80)
TESTOST FREE MFR SERPL: 1.9 % (ref 1.6–2.9)
TESTOST FREE SERPL-MCNC: 70 PG/ML (ref 47–244)
TESTOST SERPL-MCNC: 363 NG/DL (ref 300–890)

## 2019-03-15 DIAGNOSIS — R79.89 LOW TESTOSTERONE: ICD-10-CM

## 2019-04-19 DIAGNOSIS — F41.9 ANXIETY: ICD-10-CM

## 2019-04-19 RX ORDER — ALPRAZOLAM 0.25 MG/1
0.25 TABLET ORAL
Qty: 30 TAB | Refills: 0 | Status: SHIPPED
Start: 2019-04-19 | End: 2019-05-10 | Stop reason: SDUPTHER

## 2019-05-10 DIAGNOSIS — F41.9 ANXIETY: ICD-10-CM

## 2019-05-10 RX ORDER — ALPRAZOLAM 0.25 MG/1
0.25 TABLET ORAL
Qty: 30 TAB | Refills: 0 | Status: SHIPPED
Start: 2019-05-10 | End: 2019-06-09

## 2019-06-03 ENCOUNTER — OFFICE VISIT (OUTPATIENT)
Dept: MEDICAL GROUP | Facility: MEDICAL CENTER | Age: 44
End: 2019-06-03
Payer: COMMERCIAL

## 2019-06-03 VITALS
HEART RATE: 78 BPM | RESPIRATION RATE: 16 BRPM | BODY MASS INDEX: 28.06 KG/M2 | WEIGHT: 196 LBS | TEMPERATURE: 98.1 F | SYSTOLIC BLOOD PRESSURE: 138 MMHG | OXYGEN SATURATION: 95 % | HEIGHT: 70 IN | DIASTOLIC BLOOD PRESSURE: 78 MMHG

## 2019-06-03 DIAGNOSIS — F41.9 ANXIETY: ICD-10-CM

## 2019-06-03 DIAGNOSIS — E78.5 HYPERLIPIDEMIA LDL GOAL <100: ICD-10-CM

## 2019-06-03 DIAGNOSIS — R79.89 LOW TESTOSTERONE: ICD-10-CM

## 2019-06-03 DIAGNOSIS — F51.01 PRIMARY INSOMNIA: ICD-10-CM

## 2019-06-03 DIAGNOSIS — G89.29 CHRONIC RIGHT SHOULDER PAIN: ICD-10-CM

## 2019-06-03 DIAGNOSIS — M25.511 CHRONIC RIGHT SHOULDER PAIN: ICD-10-CM

## 2019-06-03 PROCEDURE — 99214 OFFICE O/P EST MOD 30 MIN: CPT | Performed by: PHYSICIAN ASSISTANT

## 2019-06-03 RX ORDER — ZOLPIDEM TARTRATE 5 MG/1
5 TABLET ORAL NIGHTLY PRN
Qty: 30 TAB | Refills: 2 | Status: SHIPPED | OUTPATIENT
Start: 2019-06-03 | End: 2019-08-23 | Stop reason: SDUPTHER

## 2019-06-03 RX ORDER — ZOLPIDEM TARTRATE 5 MG/1
TABLET ORAL
Refills: 2 | COMMUNITY
Start: 2019-04-26 | End: 2019-06-03

## 2019-06-03 NOTE — ASSESSMENT & PLAN NOTE
This is a 44-year-old male here today to discuss insomnia.  Chronic condition.  Works at night shift.  Goes home and tries to fall asleep.  Medication helps him fall asleep.  He states that there are 3 little children that are being cared for in his home.  His daughter and his granddaughter.  Also a niece and a nephew that come over for .  He applies earplugs and a facial mask for the eyes.  Ambien works.  Takes 5 mg.  Takes it daily except may be on weekends.  Medication is effective.  Requesting a refill.

## 2019-06-03 NOTE — PROGRESS NOTES
Subjective:   Genaro Kunz is a 44 y.o. male here today for insomnia, anxiety, chronic history of right shoulder pain, hyperlipidemia, testosterone.    Primary insomnia  This is a 44-year-old male here today to discuss insomnia.  Chronic condition.  Works at night shift.  Goes home and tries to fall asleep.  Medication helps him fall asleep.  He states that there are 3 little children that are being cared for in his home.  His daughter and his granddaughter.  Also a niece and a nephew that come over for .  He applies earplugs and a facial mask for the eyes.  Ambien works.  Takes 5 mg.  Takes it daily except may be on weekends.  Medication is effective.  Requesting a refill.    Anxiety  Chronic condition.  Takes alprazolam 0.25 mg as needed.  Medication is not needed today.    Chronic right shoulder pain  Chronic condition.  Has an appointment tomorrow with [facility].    Hyperlipidemia LDL goal <100  Recent labs showed his cholesterol being elevated.  Total cholesterol 210.  LDL at 139.  Last labs done in 2015 showed a normal value.  HDL and triglycerides were good.  Father had heart attack around his same age.  He had a stress test in 2015.  No findings.    Low testosterone  Testosterone level was low and then returned to normal during last labs.       Current medicines (including changes today)  Current Outpatient Prescriptions   Medication Sig Dispense Refill   • zolpidem (AMBIEN) 5 MG Tab Take 1 Tab by mouth at bedtime as needed for Sleep for up to 30 days. 30 Tab 2   • ALPRAZolam (XANAX) 0.25 MG Tab Take 1 Tab by mouth 1 time daily as needed for Anxiety for up to 30 days. 30 Tab 0     No current facility-administered medications for this visit.      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    Social History and Family History were reviewed and updated.    ROS   No chest pain, no shortness of breath, no  "abdominal pain and all other systems were reviewed and are negative.       Objective:     /78 (BP Location: Right arm, Patient Position: Sitting, BP Cuff Size: Adult)   Pulse 78   Temp 36.7 °C (98.1 °F) (Temporal)   Resp 16   Ht 1.778 m (5' 10\")   Wt 88.9 kg (196 lb)   SpO2 95%  Body mass index is 28.12 kg/m².   Physical Exam:  Constitutional: Alert, no distress.  Skin: Warm, dry, good turgor, no rashes in visible areas.  Eye: Equal, round and reactive, conjunctiva clear, lids normal.  ENMT: Lips without lesions, good dentition, oropharynx clear.  Neck: Trachea midline, no masses.   Lymph: No cervical or supraclavicular lymphadenopathy  Respiratory: Unlabored respiratory effort, lungs clear to auscultation, no wheezes, no ronchi.  Cardiovascular: Normal S1, S2, no murmur, no edema.  Psych: Alert and oriented x3, normal affect and mood.        Assessment and Plan:   The following treatment plan was discussed    1. Primary insomnia  Chronic condition.  Stable.   reviewed.  Medication appropriate.  Prescribed Ambien 5 mg as needed.  Provided a 90-day supply.  - zolpidem (AMBIEN) 5 MG Tab; Take 1 Tab by mouth at bedtime as needed for Sleep for up to 30 days.  Dispense: 30 Tab; Refill: 2    2. Hyperlipidemia LDL goal <100  New onset condition.  Advised to cut out fatty foods such as cheese and lower meat consumption.  Will recheck in 6 months.    3. Chronic right shoulder pain  Chronic condition.  Follow with orthopedics tomorrow.    4. Anxiety  Chronic condition.  Stable.  Continue Xanax as needed.  No refill provided today.    5. Low testosterone  Chronic condition.  Last labs showed a normal value.  We will repeat down the road.  There is already a lab order.      Followup: Return in about 3 months (around 9/3/2019).    Please note that this dictation was created using voice recognition software. I have made every reasonable attempt to correct obvious errors, but I expect that there are errors of " grammar and possibly content that I did not discover before finalizing the note.

## 2019-06-03 NOTE — ASSESSMENT & PLAN NOTE
Recent labs showed his cholesterol being elevated.  Total cholesterol 210.  LDL at 139.  Last labs done in 2015 showed a normal value.  HDL and triglycerides were good.  Father had heart attack around his same age.  He had a stress test in 2015.  No findings.

## 2019-07-12 DIAGNOSIS — F41.9 ANXIETY: ICD-10-CM

## 2019-07-12 RX ORDER — ALPRAZOLAM 0.25 MG/1
TABLET ORAL
Qty: 30 TAB | Refills: 0 | Status: SHIPPED
Start: 2019-08-11 | End: 2019-08-12 | Stop reason: SDUPTHER

## 2019-07-26 ENCOUNTER — APPOINTMENT (OUTPATIENT)
Dept: ADMISSIONS | Facility: MEDICAL CENTER | Age: 44
End: 2019-07-26
Attending: ORTHOPAEDIC SURGERY
Payer: COMMERCIAL

## 2019-07-26 RX ORDER — IBUPROFEN 200 MG
200 TABLET ORAL EVERY 6 HOURS PRN
COMMUNITY
End: 2019-08-23

## 2019-07-26 RX ORDER — ZOLPIDEM TARTRATE 5 MG/1
5 TABLET ORAL PRN
COMMUNITY
End: 2019-08-23

## 2019-07-31 ENCOUNTER — APPOINTMENT (OUTPATIENT)
Dept: RADIOLOGY | Facility: MEDICAL CENTER | Age: 44
End: 2019-07-31
Attending: HOSPITALIST
Payer: COMMERCIAL

## 2019-07-31 ENCOUNTER — HOSPITAL ENCOUNTER (OUTPATIENT)
Facility: MEDICAL CENTER | Age: 44
End: 2019-08-01
Attending: ORTHOPAEDIC SURGERY | Admitting: ORTHOPAEDIC SURGERY
Payer: COMMERCIAL

## 2019-07-31 ENCOUNTER — ANESTHESIA EVENT (OUTPATIENT)
Dept: SURGERY | Facility: MEDICAL CENTER | Age: 44
End: 2019-07-31
Payer: COMMERCIAL

## 2019-07-31 ENCOUNTER — ANESTHESIA (OUTPATIENT)
Dept: SURGERY | Facility: MEDICAL CENTER | Age: 44
End: 2019-07-31
Payer: COMMERCIAL

## 2019-07-31 PROBLEM — R09.02 HYPOXEMIA: Status: ACTIVE | Noted: 2019-07-31

## 2019-07-31 PROBLEM — M67.919 ROTATOR CUFF DISORDER: Status: ACTIVE | Noted: 2019-07-31

## 2019-07-31 PROCEDURE — 160002 HCHG RECOVERY MINUTES (STAT): Performed by: ORTHOPAEDIC SURGERY

## 2019-07-31 PROCEDURE — 160046 HCHG PACU - 1ST 60 MINS PHASE II: Performed by: ORTHOPAEDIC SURGERY

## 2019-07-31 PROCEDURE — 700105 HCHG RX REV CODE 258: Performed by: ANESTHESIOLOGY

## 2019-07-31 PROCEDURE — 160047 HCHG PACU  - EA ADDL 30 MINS PHASE II: Performed by: ORTHOPAEDIC SURGERY

## 2019-07-31 PROCEDURE — G0378 HOSPITAL OBSERVATION PER HR: HCPCS

## 2019-07-31 PROCEDURE — 700111 HCHG RX REV CODE 636 W/ 250 OVERRIDE (IP): Performed by: HOSPITALIST

## 2019-07-31 PROCEDURE — 99204 OFFICE O/P NEW MOD 45 MIN: CPT | Performed by: HOSPITALIST

## 2019-07-31 PROCEDURE — 700111 HCHG RX REV CODE 636 W/ 250 OVERRIDE (IP): Performed by: ANESTHESIOLOGY

## 2019-07-31 PROCEDURE — 160035 HCHG PACU - 1ST 60 MINS PHASE I: Performed by: ORTHOPAEDIC SURGERY

## 2019-07-31 PROCEDURE — 700101 HCHG RX REV CODE 250: Performed by: ANESTHESIOLOGY

## 2019-07-31 PROCEDURE — 501838 HCHG SUTURE GENERAL: Performed by: ORTHOPAEDIC SURGERY

## 2019-07-31 PROCEDURE — 302135 SEQUENTIAL COMPRESSION MACHINE: Performed by: ORTHOPAEDIC SURGERY

## 2019-07-31 PROCEDURE — 502240 HCHG MISC OR SUPPLY RC 0272: Performed by: ORTHOPAEDIC SURGERY

## 2019-07-31 PROCEDURE — 96374 THER/PROPH/DIAG INJ IV PUSH: CPT

## 2019-07-31 PROCEDURE — 502000 HCHG MISC OR IMPLANTS RC 0278: Performed by: ORTHOPAEDIC SURGERY

## 2019-07-31 PROCEDURE — 160029 HCHG SURGERY MINUTES - 1ST 30 MINS LEVEL 4: Performed by: ORTHOPAEDIC SURGERY

## 2019-07-31 PROCEDURE — 160022 HCHG BLOCK: Performed by: ORTHOPAEDIC SURGERY

## 2019-07-31 PROCEDURE — 700105 HCHG RX REV CODE 258: Performed by: ORTHOPAEDIC SURGERY

## 2019-07-31 PROCEDURE — 160036 HCHG PACU - EA ADDL 30 MINS PHASE I: Performed by: ORTHOPAEDIC SURGERY

## 2019-07-31 PROCEDURE — A9270 NON-COVERED ITEM OR SERVICE: HCPCS | Performed by: ANESTHESIOLOGY

## 2019-07-31 PROCEDURE — 700111 HCHG RX REV CODE 636 W/ 250 OVERRIDE (IP): Performed by: ORTHOPAEDIC SURGERY

## 2019-07-31 PROCEDURE — 502581 HCHG PACK, SHOULDER ARTHROSCOPY: Performed by: ORTHOPAEDIC SURGERY

## 2019-07-31 PROCEDURE — C1713 ANCHOR/SCREW BN/BN,TIS/BN: HCPCS | Performed by: ORTHOPAEDIC SURGERY

## 2019-07-31 PROCEDURE — 160041 HCHG SURGERY MINUTES - EA ADDL 1 MIN LEVEL 4: Performed by: ORTHOPAEDIC SURGERY

## 2019-07-31 PROCEDURE — 71046 X-RAY EXAM CHEST 2 VIEWS: CPT

## 2019-07-31 PROCEDURE — 700102 HCHG RX REV CODE 250 W/ 637 OVERRIDE(OP): Performed by: ANESTHESIOLOGY

## 2019-07-31 PROCEDURE — 500151 HCHG CANNULA, THRDED 8.4: Performed by: ORTHOPAEDIC SURGERY

## 2019-07-31 PROCEDURE — 160048 HCHG OR STATISTICAL LEVEL 1-5: Performed by: ORTHOPAEDIC SURGERY

## 2019-07-31 PROCEDURE — 160025 RECOVERY II MINUTES (STATS): Performed by: ORTHOPAEDIC SURGERY

## 2019-07-31 PROCEDURE — 160009 HCHG ANES TIME/MIN: Performed by: ORTHOPAEDIC SURGERY

## 2019-07-31 DEVICE — ANCHOR ICONIX 1-#2 FORCEFIBER 1.4MM (5EA/BX): Type: IMPLANTABLE DEVICE | Site: SHOULDER | Status: FUNCTIONAL

## 2019-07-31 DEVICE — SUTURE 1.2MM/2MM ANCHOR TAPE ICONIX SPEED (5EA/BX): Type: IMPLANTABLE DEVICE | Site: SHOULDER | Status: FUNCTIONAL

## 2019-07-31 DEVICE — SUTURE 5.5 MM ANCHOR KNOTLESS APOLLO (5EA/BX): Type: IMPLANTABLE DEVICE | Site: SHOULDER | Status: FUNCTIONAL

## 2019-07-31 RX ORDER — OXYCODONE HCL 5 MG/5 ML
5 SOLUTION, ORAL ORAL
Status: DISCONTINUED | OUTPATIENT
Start: 2019-07-31 | End: 2019-07-31 | Stop reason: HOSPADM

## 2019-07-31 RX ORDER — PROMETHAZINE HYDROCHLORIDE 25 MG/1
12.5-25 SUPPOSITORY RECTAL EVERY 4 HOURS PRN
Status: DISCONTINUED | OUTPATIENT
Start: 2019-07-31 | End: 2019-08-01 | Stop reason: HOSPADM

## 2019-07-31 RX ORDER — DEXAMETHASONE SODIUM PHOSPHATE 4 MG/ML
INJECTION, SOLUTION INTRA-ARTICULAR; INTRALESIONAL; INTRAMUSCULAR; INTRAVENOUS; SOFT TISSUE PRN
Status: DISCONTINUED | OUTPATIENT
Start: 2019-07-31 | End: 2019-07-31 | Stop reason: SURG

## 2019-07-31 RX ORDER — CEFAZOLIN SODIUM 1 G/3ML
INJECTION, POWDER, FOR SOLUTION INTRAMUSCULAR; INTRAVENOUS PRN
Status: DISCONTINUED | OUTPATIENT
Start: 2019-07-31 | End: 2019-07-31 | Stop reason: SURG

## 2019-07-31 RX ORDER — DIPHENHYDRAMINE HYDROCHLORIDE 50 MG/ML
12.5 INJECTION INTRAMUSCULAR; INTRAVENOUS
Status: DISCONTINUED | OUTPATIENT
Start: 2019-07-31 | End: 2019-07-31 | Stop reason: HOSPADM

## 2019-07-31 RX ORDER — POLYETHYLENE GLYCOL 3350 17 G/17G
1 POWDER, FOR SOLUTION ORAL
Status: DISCONTINUED | OUTPATIENT
Start: 2019-07-31 | End: 2019-08-01 | Stop reason: HOSPADM

## 2019-07-31 RX ORDER — ONDANSETRON 2 MG/ML
INJECTION INTRAMUSCULAR; INTRAVENOUS PRN
Status: DISCONTINUED | OUTPATIENT
Start: 2019-07-31 | End: 2019-07-31 | Stop reason: SURG

## 2019-07-31 RX ORDER — ROPIVACAINE HYDROCHLORIDE 5 MG/ML
INJECTION, SOLUTION EPIDURAL; INFILTRATION; PERINEURAL
Status: DISCONTINUED | OUTPATIENT
Start: 2019-07-31 | End: 2019-07-31 | Stop reason: HOSPADM

## 2019-07-31 RX ORDER — EPINEPHRINE 1 MG/ML(1)
AMPUL (ML) INJECTION
Status: DISCONTINUED | OUTPATIENT
Start: 2019-07-31 | End: 2019-07-31 | Stop reason: HOSPADM

## 2019-07-31 RX ORDER — MEPERIDINE HYDROCHLORIDE 25 MG/ML
6.25 INJECTION INTRAMUSCULAR; INTRAVENOUS; SUBCUTANEOUS
Status: DISCONTINUED | OUTPATIENT
Start: 2019-07-31 | End: 2019-07-31 | Stop reason: HOSPADM

## 2019-07-31 RX ORDER — OXYCODONE HCL 5 MG/5 ML
10 SOLUTION, ORAL ORAL
Status: DISCONTINUED | OUTPATIENT
Start: 2019-07-31 | End: 2019-07-31 | Stop reason: HOSPADM

## 2019-07-31 RX ORDER — ROPIVACAINE HYDROCHLORIDE 5 MG/ML
INJECTION, SOLUTION EPIDURAL; INFILTRATION; PERINEURAL
Status: COMPLETED | OUTPATIENT
Start: 2019-07-31 | End: 2019-07-31

## 2019-07-31 RX ORDER — OXYCODONE HCL 10 MG/1
10 TABLET, FILM COATED, EXTENDED RELEASE ORAL ONCE
Status: COMPLETED | OUTPATIENT
Start: 2019-07-31 | End: 2019-07-31

## 2019-07-31 RX ORDER — MAGNESIUM SULFATE HEPTAHYDRATE 40 MG/ML
INJECTION, SOLUTION INTRAVENOUS PRN
Status: DISCONTINUED | OUTPATIENT
Start: 2019-07-31 | End: 2019-07-31 | Stop reason: SURG

## 2019-07-31 RX ORDER — PROMETHAZINE HYDROCHLORIDE 25 MG/1
12.5-25 TABLET ORAL EVERY 4 HOURS PRN
Status: DISCONTINUED | OUTPATIENT
Start: 2019-07-31 | End: 2019-08-01 | Stop reason: HOSPADM

## 2019-07-31 RX ORDER — AMOXICILLIN 250 MG
2 CAPSULE ORAL 2 TIMES DAILY
Status: DISCONTINUED | OUTPATIENT
Start: 2019-07-31 | End: 2019-08-01 | Stop reason: HOSPADM

## 2019-07-31 RX ORDER — SODIUM CHLORIDE, SODIUM LACTATE, POTASSIUM CHLORIDE, CALCIUM CHLORIDE 600; 310; 30; 20 MG/100ML; MG/100ML; MG/100ML; MG/100ML
INJECTION, SOLUTION INTRAVENOUS CONTINUOUS
Status: ACTIVE | OUTPATIENT
Start: 2019-07-31 | End: 2019-07-31

## 2019-07-31 RX ORDER — SODIUM CHLORIDE, SODIUM LACTATE, POTASSIUM CHLORIDE, CALCIUM CHLORIDE 600; 310; 30; 20 MG/100ML; MG/100ML; MG/100ML; MG/100ML
INJECTION, SOLUTION INTRAVENOUS CONTINUOUS
Status: DISCONTINUED | OUTPATIENT
Start: 2019-07-31 | End: 2019-07-31 | Stop reason: HOSPADM

## 2019-07-31 RX ORDER — DEXAMETHASONE SODIUM PHOSPHATE 10 MG/ML
INJECTION, SOLUTION INTRAMUSCULAR; INTRAVENOUS PRN
Status: DISCONTINUED | OUTPATIENT
Start: 2019-07-31 | End: 2019-07-31 | Stop reason: SURG

## 2019-07-31 RX ORDER — HYDROMORPHONE HYDROCHLORIDE 1 MG/ML
0.2 INJECTION, SOLUTION INTRAMUSCULAR; INTRAVENOUS; SUBCUTANEOUS
Status: DISCONTINUED | OUTPATIENT
Start: 2019-07-31 | End: 2019-07-31 | Stop reason: HOSPADM

## 2019-07-31 RX ORDER — ROPIVACAINE HYDROCHLORIDE 5 MG/ML
INJECTION, SOLUTION EPIDURAL; INFILTRATION; PERINEURAL PRN
Status: DISCONTINUED | OUTPATIENT
Start: 2019-07-31 | End: 2019-07-31 | Stop reason: SURG

## 2019-07-31 RX ORDER — DEXAMETHASONE SODIUM PHOSPHATE 10 MG/ML
INJECTION, SOLUTION INTRAMUSCULAR; INTRAVENOUS
Status: COMPLETED | OUTPATIENT
Start: 2019-07-31 | End: 2019-07-31

## 2019-07-31 RX ORDER — ONDANSETRON 2 MG/ML
4 INJECTION INTRAMUSCULAR; INTRAVENOUS
Status: COMPLETED | OUTPATIENT
Start: 2019-07-31 | End: 2019-07-31

## 2019-07-31 RX ORDER — CLINDAMYCIN PHOSPHATE 150 MG/ML
INJECTION, SOLUTION INTRAVENOUS PRN
Status: DISCONTINUED | OUTPATIENT
Start: 2019-07-31 | End: 2019-07-31 | Stop reason: SURG

## 2019-07-31 RX ORDER — ALPRAZOLAM 0.25 MG/1
0.25 TABLET ORAL NIGHTLY PRN
Status: DISCONTINUED | OUTPATIENT
Start: 2019-07-31 | End: 2019-08-01 | Stop reason: HOSPADM

## 2019-07-31 RX ORDER — ACETAMINOPHEN 500 MG
500 TABLET ORAL EVERY 6 HOURS PRN
COMMUNITY
End: 2020-05-19

## 2019-07-31 RX ORDER — BISACODYL 10 MG
10 SUPPOSITORY, RECTAL RECTAL
Status: DISCONTINUED | OUTPATIENT
Start: 2019-07-31 | End: 2019-08-01 | Stop reason: HOSPADM

## 2019-07-31 RX ORDER — ACETAMINOPHEN 500 MG
1000 TABLET ORAL ONCE
Status: COMPLETED | OUTPATIENT
Start: 2019-07-31 | End: 2019-07-31

## 2019-07-31 RX ORDER — HYDRALAZINE HYDROCHLORIDE 20 MG/ML
5 INJECTION INTRAMUSCULAR; INTRAVENOUS
Status: DISCONTINUED | OUTPATIENT
Start: 2019-07-31 | End: 2019-07-31 | Stop reason: HOSPADM

## 2019-07-31 RX ORDER — MIDAZOLAM HYDROCHLORIDE 1 MG/ML
1 INJECTION INTRAMUSCULAR; INTRAVENOUS
Status: DISCONTINUED | OUTPATIENT
Start: 2019-07-31 | End: 2019-07-31 | Stop reason: HOSPADM

## 2019-07-31 RX ORDER — HYDROMORPHONE HYDROCHLORIDE 1 MG/ML
0.4 INJECTION, SOLUTION INTRAMUSCULAR; INTRAVENOUS; SUBCUTANEOUS
Status: DISCONTINUED | OUTPATIENT
Start: 2019-07-31 | End: 2019-07-31 | Stop reason: HOSPADM

## 2019-07-31 RX ORDER — ONDANSETRON 4 MG/1
4 TABLET, ORALLY DISINTEGRATING ORAL EVERY 4 HOURS PRN
Status: DISCONTINUED | OUTPATIENT
Start: 2019-07-31 | End: 2019-08-01 | Stop reason: HOSPADM

## 2019-07-31 RX ORDER — LABETALOL HYDROCHLORIDE 5 MG/ML
5 INJECTION, SOLUTION INTRAVENOUS
Status: DISCONTINUED | OUTPATIENT
Start: 2019-07-31 | End: 2019-07-31 | Stop reason: HOSPADM

## 2019-07-31 RX ORDER — MIDAZOLAM HYDROCHLORIDE 1 MG/ML
INJECTION INTRAMUSCULAR; INTRAVENOUS PRN
Status: DISCONTINUED | OUTPATIENT
Start: 2019-07-31 | End: 2019-07-31 | Stop reason: SURG

## 2019-07-31 RX ORDER — ROCURONIUM BROMIDE 10 MG/ML
INJECTION, SOLUTION INTRAVENOUS PRN
Status: DISCONTINUED | OUTPATIENT
Start: 2019-07-31 | End: 2019-07-31 | Stop reason: SURG

## 2019-07-31 RX ORDER — ONDANSETRON 2 MG/ML
4 INJECTION INTRAMUSCULAR; INTRAVENOUS EVERY 4 HOURS PRN
Status: DISCONTINUED | OUTPATIENT
Start: 2019-07-31 | End: 2019-08-01 | Stop reason: HOSPADM

## 2019-07-31 RX ORDER — ACETAMINOPHEN 325 MG/1
650 TABLET ORAL EVERY 6 HOURS PRN
Status: DISCONTINUED | OUTPATIENT
Start: 2019-07-31 | End: 2019-08-01 | Stop reason: HOSPADM

## 2019-07-31 RX ORDER — HYDROMORPHONE HYDROCHLORIDE 1 MG/ML
0.1 INJECTION, SOLUTION INTRAMUSCULAR; INTRAVENOUS; SUBCUTANEOUS
Status: DISCONTINUED | OUTPATIENT
Start: 2019-07-31 | End: 2019-07-31 | Stop reason: HOSPADM

## 2019-07-31 RX ORDER — CELECOXIB 200 MG/1
400 CAPSULE ORAL ONCE
Status: COMPLETED | OUTPATIENT
Start: 2019-07-31 | End: 2019-07-31

## 2019-07-31 RX ORDER — HALOPERIDOL 5 MG/ML
1 INJECTION INTRAMUSCULAR
Status: DISCONTINUED | OUTPATIENT
Start: 2019-07-31 | End: 2019-07-31 | Stop reason: HOSPADM

## 2019-07-31 RX ORDER — ZOLPIDEM TARTRATE 5 MG/1
5 TABLET ORAL PRN
Status: DISCONTINUED | OUTPATIENT
Start: 2019-07-31 | End: 2019-08-01 | Stop reason: HOSPADM

## 2019-07-31 RX ADMIN — MAGNESIUM SULFATE IN WATER 4 G: 40 INJECTION, SOLUTION INTRAVENOUS at 11:37

## 2019-07-31 RX ADMIN — OXYCODONE HYDROCHLORIDE 10 MG: 10 TABLET, FILM COATED, EXTENDED RELEASE ORAL at 10:10

## 2019-07-31 RX ADMIN — ONDANSETRON 4 MG: 2 INJECTION INTRAMUSCULAR; INTRAVENOUS at 22:05

## 2019-07-31 RX ADMIN — MIDAZOLAM HYDROCHLORIDE 4 MG: 1 INJECTION, SOLUTION INTRAMUSCULAR; INTRAVENOUS at 11:18

## 2019-07-31 RX ADMIN — FENTANYL CITRATE 50 MCG: 50 INJECTION, SOLUTION INTRAMUSCULAR; INTRAVENOUS at 11:33

## 2019-07-31 RX ADMIN — DEXAMETHASONE SODIUM PHOSPHATE 2 MG: 10 INJECTION, SOLUTION INTRAMUSCULAR; INTRAVENOUS at 11:19

## 2019-07-31 RX ADMIN — ROPIVACAINE HYDROCHLORIDE 30 ML: 5 INJECTION, SOLUTION EPIDURAL; INFILTRATION; PERINEURAL at 12:04

## 2019-07-31 RX ADMIN — CELECOXIB 400 MG: 200 CAPSULE ORAL at 10:10

## 2019-07-31 RX ADMIN — ONDANSETRON 4 MG: 2 INJECTION INTRAMUSCULAR; INTRAVENOUS at 14:08

## 2019-07-31 RX ADMIN — PROPOFOL 300 MG: 10 INJECTION, EMULSION INTRAVENOUS at 11:33

## 2019-07-31 RX ADMIN — ACETAMINOPHEN 1000 MG: 500 TABLET, FILM COATED ORAL at 10:10

## 2019-07-31 RX ADMIN — CLINDAMYCIN PHOSPHATE 900 MG: 150 INJECTION, SOLUTION INTRAMUSCULAR; INTRAVENOUS at 12:37

## 2019-07-31 RX ADMIN — ROCURONIUM BROMIDE 50 MG: 10 INJECTION INTRAVENOUS at 11:33

## 2019-07-31 RX ADMIN — ALBUTEROL SULFATE 2.5 MG: 2.5 SOLUTION RESPIRATORY (INHALATION) at 19:02

## 2019-07-31 RX ADMIN — SODIUM CHLORIDE, POTASSIUM CHLORIDE, SODIUM LACTATE AND CALCIUM CHLORIDE: 600; 310; 30; 20 INJECTION, SOLUTION INTRAVENOUS at 10:08

## 2019-07-31 RX ADMIN — DEXAMETHASONE SODIUM PHOSPHATE 8 MG: 4 INJECTION, SOLUTION INTRAMUSCULAR; INTRAVENOUS at 11:33

## 2019-07-31 RX ADMIN — ONDANSETRON HYDROCHLORIDE 4 MG: 2 INJECTION, SOLUTION INTRAMUSCULAR; INTRAVENOUS at 16:31

## 2019-07-31 RX ADMIN — SODIUM CHLORIDE, POTASSIUM CHLORIDE, SODIUM LACTATE AND CALCIUM CHLORIDE: 600; 310; 30; 20 INJECTION, SOLUTION INTRAVENOUS at 16:35

## 2019-07-31 RX ADMIN — DEXAMETHASONE SODIUM PHOSPHATE 2 MG: 10 INJECTION, SOLUTION INTRAMUSCULAR; INTRAVENOUS at 12:04

## 2019-07-31 RX ADMIN — CEFAZOLIN 2 G: 1 INJECTION, POWDER, FOR SOLUTION INTRAVENOUS at 11:33

## 2019-07-31 RX ADMIN — ROPIVACAINE HYDROCHLORIDE 30 ML: 5 INJECTION, SOLUTION EPIDURAL; INFILTRATION; PERINEURAL at 11:19

## 2019-07-31 RX ADMIN — EPHEDRINE SULFATE 10 MG: 50 INJECTION INTRAMUSCULAR; INTRAVENOUS; SUBCUTANEOUS at 12:43

## 2019-07-31 ASSESSMENT — ENCOUNTER SYMPTOMS
FEVER: 0
VOMITING: 0
HEMOPTYSIS: 0
TINGLING: 0
NECK PAIN: 0
WEAKNESS: 1
CLAUDICATION: 0
EYE PAIN: 0
DOUBLE VISION: 0
SPEECH CHANGE: 0
NERVOUS/ANXIOUS: 0
PALPITATIONS: 0
BLURRED VISION: 0
SPUTUM PRODUCTION: 0
PHOTOPHOBIA: 0
NAUSEA: 0
PND: 0
CONSTIPATION: 0
DIZZINESS: 0
BLOOD IN STOOL: 0
CHILLS: 0
COUGH: 1
SHORTNESS OF BREATH: 0
MEMORY LOSS: 0
BACK PAIN: 0
STRIDOR: 0
DEPRESSION: 0
HEARTBURN: 0
SENSORY CHANGE: 0
SORE THROAT: 0
TREMORS: 0
HEADACHES: 0
ORTHOPNEA: 0
MYALGIAS: 0

## 2019-07-31 ASSESSMENT — LIFESTYLE VARIABLES: EVER_SMOKED: NEVER

## 2019-07-31 ASSESSMENT — PAIN SCALES - GENERAL: PAIN_LEVEL: 2

## 2019-07-31 NOTE — ANESTHESIA POSTPROCEDURE EVALUATION
In second stage, the patient could not maintqain an adequate oxygen saturation on room air, dropping into the 77%-80%. Nebulizer treatment was given. Home oxygen was not available. Discussed iwth Dr. Brock. Admit to hospitalist service for the evening. Report/handoff to Dr. Van. 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   On 2lpm nc o2 sat was 95% at 2145 laswt night.  98% this morning.        Patient: Genaro Kunz    Procedure Summary     Date:  07/31/19 Room / Location:   OR  / SURGERY [facility]    Anesthesia Start:  1131 Anesthesia Stop:  1442    Procedures:       ARTHROSCOPY, SHOULDER posterior labral (Right )      EXCISION, CLAVICLE, DISTAL - POSS OPEN, DANIELSON      REPAIR, ROTATOR CUFF      TENODESIS, BICEPS, OPEN      TENOTOMY      DECOMPRESSION, SUBACROMIAL SPACE Diagnosis:  (Right RTC tear; posterior labral tear; biceps SLAP tear; AC joint inflamation arthritis and Impingement)    Surgeon:  Suresh Brock M.D. Responsible Provider:  Oziel Shah M.D.    Anesthesia Type:  general, peripheral nerve block ASA Status:  2          Final Anesthesia Type: general, peripheral nerve block  Last vitals  BP   Blood Pressure: 103/64    Temp   36 °C (96.8 °F)    Pulse   Pulse: 76   Resp   16    SpO2   94 %      Anesthesia Post Evaluation    Patient location during evaluation: PACU  Patient participation: complete - patient participated  Level of consciousness: sleepy but conscious  Pain score: 2    Airway patency: patent  Anesthetic complications: no  Cardiovascular status: hemodynamically stable  Respiratory status: acceptable  Hydration status: euvolemic    PONV: none           Nurse Pain Score: 6 (NPRS)

## 2019-07-31 NOTE — ANESTHESIA PREPROCEDURE EVALUATION
45 yo male with right shoulder pain, probable impingement  H/0 asthma, GERD    Relevant Problems   GI   (+) GERD (gastroesophageal reflux disease)       Physical Exam    Airway   Mallampati: II  TM distance: >3 FB  Neck ROM: full       Cardiovascular - normal exam  Rhythm: regular  Rate: normal  (-) murmur     Dental - normal exam         Pulmonary - normal exam  Breath sounds clear to auscultation     Abdominal   Abdomen: soft  Bowel sounds: normal   Neurological - normal exam                 Anesthesia Plan    ASA 2       Plan - general and peripheral nerve block     Peripheral nerve block will be post-op pain control  Airway plan will be ETT        Induction: intravenous    Postoperative Plan: Postoperative administration of opioids is intended.    Pertinent diagnostic labs and testing reviewed    Informed Consent:    Anesthetic plan and risks discussed with patient.    Use of blood products discussed with: patient whom consented to blood products.

## 2019-07-31 NOTE — ANESTHESIA QCDR
2019 [facility] Clinical Data Registry (for Quality Improvement)     Postoperative nausea/vomiting risk protocol (Adult = 18 yrs and Pediatric 3-17 yrs)- (430 and 463)  General inhalation anesthetic (NOT TIVA) with PONV risk factors: Yes  Provision of anti-emetic therapy with at least 2 different classes of agents: Yes   Patient DID NOT receive anti-emetic therapy and reason is documented in Medical Record:  N/A    Multimodal Pain Management- (AQI59)  Patient undergoing Elective Surgery (i.e. Outpatient, or ASC, or Prescheduled Surgery prior to Hospital Admission): Yes  Use of Multimodal Pain Management, two or more drugs and/or interventions, NOT including systemic opioids: Yes   Exception: Documented allergy to multiple classes of analgesics:  N/A    PACU assessment of acute postoperative pain prior to Anesthesia Care End- Applies to Patients Age = 18- (ABG7)  Initial PACU pain score is which of the following: < 7/10  Patient unable to report pain score: N/A    Post-anesthetic transfer of care checklist/protocol to PACU/ICU- (426 and 427)  Upon conclusion of case, patient transferred to which of the following locations: PACU/Non-ICU  Use of transfer checklist/protocol: Yes  Exclusion: Service Performed in Patient Hospital Room (and thus did not require transfer): N/A    PACU Reintubation- (AQI31)  General anesthesia requiring endotracheal intubation (ETT) along with subsequent extubation in OR or PACU: Yes  Required reintubation in the PACU: No   Extubation was a planned trial documented in the medical record prior to removal of the original airway device:  N/A    Unplanned admission to ICU related to anesthesia service up through end of PACU care- (MD51)  Unplanned admission to ICU (not initially anticipated at anesthesia start time): No

## 2019-07-31 NOTE — OR NURSING
1437 received from or  Oral airway intact  resp spont r shoulder dressing c/d/i  Fingers warm  Cap refill<3 sec  Brace intact  Ice pack applied 1510 awake  Oral airway dc'd  resp spont  No c/o pain 1530 remains painfree  Using is to 2500

## 2019-07-31 NOTE — OP REPORT
DATE OF SERVICE:  07/31/2019    PREOPERATIVE DIAGNOSES:  Right shoulder distal clavicle inflammation, possible   rotator cuff injury.    POSTOPERATIVE DIAGNOSES:  Right shoulder superior labral anterior and   posterior tear (SLAP tear), right shoulder rotator cuff tear, right shoulder   posterior inferior labral tear, right shoulder distal clavicle   arthritis/inflammation, and right shoulder impingement with rotator cuff tear.    PROCEDURES PERFORMED:  Evaluation of the right shoulder under anesthesia,   arthroscopic evaluation of the right shoulder, arthroscopic rotator cuff   repair, arthroscopic biceps tenotomy with subsequent open biceps tenodesis,   arthroscopic posterior inferior labral repair, arthroscopic debridement of   bicipital stump, arthroscopic rotator cuff repair, arthroscopic distal   clavicle excision, and arthroscopic subacromial decompression.    SURGEON:  Suresh Brock MD    ASSISTANT:  BRITNEY Rutherford    ANESTHESIA:  General, with regional anesthesia for postoperative analgesia.    ANESTHESIOLOGIST:  Oziel Shah MD    DRAINS:  None.    SPECIMENS:  None.    COMPLICATIONS:  None known.    HISTORY:  This is a 44-year-old gentleman who sustained an injury to his   shoulder.  He has had persistent pain for several months.  His MRI is somewhat   equivocal.  He had reproducible findings and palpable tenderness about his AC   joint, but definite irritability and weakness with attempted active mobility   of the shoulder.  With persistent symptoms, a trial of rest and activity   modification, he is felt to be a candidate for shoulder arthroscopy.    DESCRIPTION OF PROCEDURE:  Therefore, after appropriate laboratory studies and   consents were obtained, the patient was brought today to the operating room,   placed supine on the operating table, general anesthesia obtained.  Ancef was   administered preoperatively.  Shoulder was examined.  The patient demonstrated   full forward  elevation, full abduction passively.  Load and shift test   revealed no significant anterior posterior instability.  The limb was   sterilely prepped and draped in usual manner after the patient had been placed   in lateral decubitus position with all bony prominences padded and an   axillary roll had been placed.  Standard arthroscopy portals were then   created.  Scope was introduced in the glenohumeral joint.  The articular   surface of the humeral head and glenoid appeared to be intact.  The   undersurface of the rotator cuff had fraying involving the supraspinatus   extending posteriorly towards the infraspinatus.  That area was marked with a   PDS suture.  Subscap tendon appeared to be intact.  Biceps tendon had some   fraying in its substance, but complete separation of the bicipital origin off   the glenoid with just probing of the superior labrum.  This was consistent   with a SLAP tear.    The anterior and anterior inferior labrum appeared to be intact.  Posterior   labrum though had yen detachment from the glenoid rim.    Given those findings intraoperatively, the biceps tendon was tenotomized using   arthroscopic scissors.  Tendon was initially allowed to retract into the   proximal arm.  The bicipital stump was then smoothed with a shaver.  The   posterior inferior labrum was addressed.  An accessory posterior portal was   created in the usual manner with a nick and spread technique.  The posterior   glenoid rim corresponding to the labral tear was roughened and cleaned with a   shaver, wandy, and an arthroscopic elevator.  Two  holes were created in   the posterior glenoid rim.  Each was filled with Iconix suture anchor.    Sutures from each anchor were then passed through the corresponding posterior   inferior labrum in a vertical mattress stitch configuration.  Arthroscopic   sliding knots were then created securing the labrum up against the prepared   glenoid rim.  The repair was then probed,  felt to have good tension and   position.  Redundant fluid was milked from the glenohumeral joint.  The joint   was injected with ropivacaine trickled in through the scope cannula on minimal   pressure.    Attention was then directed to the anterior aspect of the proximal arm.  A   longitudinal incision just at the superior edge of the axilla was created.    Skin and subcutaneous tissue incised sharply, then digital and scissor   dissection down to the inferior border of the pectoralis major tendon.  This   was traced to its insertion.  At that level and just above, the bicipital   groove was identified.  Tendon sheath of the long head of the biceps was   opened in a limited fashion identifying the biceps.  Two unicortical drill   holes were created in the proximal humerus.  Each was filled with small   diameter Iconix anchor.  The sutures from the anchor were then woven around   and into the biceps tendon remnant in the usual manner.  Proximal biceps   remnant that was proximal to the tenodesis site was excised sharply and the   tendon remnant removed.  Incision was copiously irrigated with antibiotic   treated saline.  Retractors were placed under direct visualization by myself   and then removed by myself.    Attention was then directed back to the subacromial space.  The scope was   inserted in that space.  Using a shaver and a thermal device, the thickened   prominent bursal tissue was resected.  This allowed direct visualization of   the bursal side of the rotator cuff.  The area that had been marked with a PDS   suture was closely inspected through an accessory lateral portal and probed.    There was easy penetration of the cuff tissue at that level.  The diseased   cuff remnant was debrided using a shaver and scissors.  This allowed direct   access to the articular margin of the humerus.  Bone lateral to it was   roughened and then perforated with small diameter drill to create bleeding   bone.  An Allihub  anchor was then delivered at the articular margin.  Two   suture pairs in the same anchor, one tail was passed medially from the edge of   the tear.  Just lateral to that 2 suture passing, a SutureTape was passed to   create a rip-stop configuration.  The anterior and posterior tails of the   rip-stop were then brought out laterally and fixed to an Apollo anchor.     hole was created off the edge of the greater tuberosity and that anchor and   tape construct was delivered into the  hole creating lateral fixation as   well as securing the rip-stop.  Then, attention was redirected to the smaller   diameter sutures from the medial anchor placed previously.  Those suture pairs   were re-mated and arthroscopic sliding knots were tied, creating medial row   fixation of this repair.  Once that was completed, the repair was probed and   visualized, felt to be intact.  Due to the patient's preop MRI and AC joint   tenderness on 2 exams, attention was directed to distal clavicle.  Using a   bone resector, approximately a centimeter of the distal clavicle was resected,   working primarily from the anterior portal.  That was noted during the   rotator cuff repair, prominent anterior and anterolateral acromion that was   resected approximately 8-10 mm worth using a bone resector.  Subacromial space   was then copiously irrigated.  Scope was then reinserted in this glenohumeral   joint.  The undersurface of the repair was inspected, felt to have good   position.  Scope was then removed.  Arthroscopy portals closed in usual   manner.  Tenodesis incision also closed in usual manner and then skin on that   incision closed with running stitch.  Sterile dressing was then applied and   the shoulder was placed in a shoulder immobilizer.  At the time of dictation,   the patient had been taken to recovery room in stable condition.       ____________________________________     MD SHASHANK CHEATHAM / LUCIANA    DD:  07/31/2019  14:37:52  DT:  07/31/2019 14:57:55    D#:  [id number]  Job#:  [id number]

## 2019-07-31 NOTE — ANESTHESIA PROCEDURE NOTES
Airway  Date/Time: 7/31/2019 11:34 AM  Performed by: Oziel Shah M.D.  Authorized by: Oziel Shah M.D.     Location:  OR  Urgency:  Elective  Indications for Airway Management:  Anesthesia  Spontaneous Ventilation: absent    Sedation Level:  Deep  Preoxygenated: Yes    Patient Position:  Sniffing  Mask Difficulty Assessment:  1 - vent by mask  Final Airway Type:  Endotracheal airway  Final Endotracheal Airway:  ETT  Cuffed: Yes    Technique Used for Successful ETT Placement:  Direct laryngoscopy  Insertion Site:  Oral  Blade Type:  Lonnie  Laryngoscope Blade/Videolaryngoscope Blade Size:  3  ETT Size (mm):  8.0  Measured from:  Teeth  ETT to Teeth (cm):  22  Placement Verified by: auscultation and capnometry    Cormack-Lehane Classification:  Grade IIb - view of arytenoids or posterior of glottis only  Number of Attempts at Approach:  1

## 2019-07-31 NOTE — ANESTHESIA PROCEDURE NOTES
Peripheral Block  Performed by: Oziel Shah M.D.  Authorized by: Oziel Shah M.D.     Patient Location:  Pre-op  Start Time:  7/31/2019 11:19 AM  End Time:  7/31/2019 11:21 AM  Reason for Block: at surgeon's request and post-op pain management    patient identified, IV checked, site marked, risks and benefits discussed, surgical consent, monitors and equipment checked, pre-op evaluation and timeout performed    Patient Position:  Supine  Prep: ChloraPrep    Monitoring:  Heart rate, continuous pulse ox and cardiac monitor  Block Region:  Upper Extremity  Upper Extremity - Block Type:  BRACHIAL PLEXUS block, Interscalene approach    Laterality:  Right  Procedures: ultrasound guided  Image captured, interpreted and electronically stored.  Local Infiltration:  Lidocaine  Strength:  2 %  Dose:  3 ml  Block Type:  Single-shot  Needle Length:  100mm  Needle Gauge:  21 G  Needle Localization:  Ultrasound guidance  Injection Assessment:  Negative aspiration for heme, no paresthesia on injection, incremental injection and local visualized surrounding nerve on ultrasound  Evidence of intravascular injection: No     US Guided Interscalene Brachial Plexus Block   US transducer placed on the neck in oblique plane approximately at the level of C6.  Anterior and Middle Scalene (MSM) muscles identified with nerve trunks identified between the muscles.  Needle inserted lateral to probe and advanced under direct visualization through the MSM into a perineural position.  After negative aspiration, LA injected with ease and visualized surrounding the nerve trunks. Image saved electronically on m-turbo and printed on mount sheet

## 2019-07-31 NOTE — ANESTHESIA TIME REPORT
Anesthesia Start and Stop Event Times     Date Time Event    7/31/2019 1102 Ready for Procedure     1131 Anesthesia Start     1442 Anesthesia Stop        Responsible Staff  07/31/19    Name Role Begin End    Oziel Shah M.D. Anesth 1131 1442        Preop Diagnosis (Free Text):  Pre-op Diagnosis     M19.011;         Preop Diagnosis (Codes):    Post op Diagnosis  Labral tear of shoulder, right, initial encounter      Premium Reason  Non-Premium    Comments:

## 2019-08-01 VITALS
RESPIRATION RATE: 16 BRPM | DIASTOLIC BLOOD PRESSURE: 97 MMHG | OXYGEN SATURATION: 96 % | SYSTOLIC BLOOD PRESSURE: 153 MMHG | TEMPERATURE: 97.5 F | BODY MASS INDEX: 26.88 KG/M2 | WEIGHT: 192 LBS | HEART RATE: 99 BPM | HEIGHT: 71 IN

## 2019-08-01 LAB
ALBUMIN SERPL BCP-MCNC: 3.5 G/DL (ref 3.2–4.9)
ALBUMIN/GLOB SERPL: 1.5 G/DL
ALP SERPL-CCNC: 40 U/L (ref 30–99)
ALT SERPL-CCNC: 16 U/L (ref 2–50)
ANION GAP SERPL CALC-SCNC: 7 MMOL/L (ref 0–11.9)
AST SERPL-CCNC: 20 U/L (ref 12–45)
BASOPHILS # BLD AUTO: 0.1 % (ref 0–1.8)
BASOPHILS # BLD: 0.01 K/UL (ref 0–0.12)
BILIRUB SERPL-MCNC: 1 MG/DL (ref 0.1–1.5)
BUN SERPL-MCNC: 10 MG/DL (ref 8–22)
CALCIUM SERPL-MCNC: 8.5 MG/DL (ref 8.4–10.2)
CHLORIDE SERPL-SCNC: 108 MMOL/L (ref 96–112)
CO2 SERPL-SCNC: 25 MMOL/L (ref 20–33)
CREAT SERPL-MCNC: 1 MG/DL (ref 0.5–1.4)
EOSINOPHIL # BLD AUTO: 0 K/UL (ref 0–0.51)
EOSINOPHIL NFR BLD: 0 % (ref 0–6.9)
ERYTHROCYTE [DISTWIDTH] IN BLOOD BY AUTOMATED COUNT: 43.4 FL (ref 35.9–50)
GLOBULIN SER CALC-MCNC: 2.3 G/DL (ref 1.9–3.5)
GLUCOSE SERPL-MCNC: 111 MG/DL (ref 65–99)
HCT VFR BLD AUTO: 42.5 % (ref 42–52)
HGB BLD-MCNC: 14.2 G/DL (ref 14–18)
IMM GRANULOCYTES # BLD AUTO: 0.07 K/UL (ref 0–0.11)
IMM GRANULOCYTES NFR BLD AUTO: 0.6 % (ref 0–0.9)
LYMPHOCYTES # BLD AUTO: 0.82 K/UL (ref 1–4.8)
LYMPHOCYTES NFR BLD: 6.6 % (ref 22–41)
MCH RBC QN AUTO: 31.2 PG (ref 27–33)
MCHC RBC AUTO-ENTMCNC: 33.4 G/DL (ref 33.7–35.3)
MCV RBC AUTO: 93.4 FL (ref 81.4–97.8)
MONOCYTES # BLD AUTO: 0.76 K/UL (ref 0–0.85)
MONOCYTES NFR BLD AUTO: 6.1 % (ref 0–13.4)
NEUTROPHILS # BLD AUTO: 10.83 K/UL (ref 1.82–7.42)
NEUTROPHILS NFR BLD: 86.6 % (ref 44–72)
NRBC # BLD AUTO: 0 K/UL
NRBC BLD-RTO: 0 /100 WBC
PLATELET # BLD AUTO: 230 K/UL (ref 164–446)
PMV BLD AUTO: 10.4 FL (ref 9–12.9)
POTASSIUM SERPL-SCNC: 4.5 MMOL/L (ref 3.6–5.5)
PROT SERPL-MCNC: 5.8 G/DL (ref 6–8.2)
RBC # BLD AUTO: 4.55 M/UL (ref 4.7–6.1)
SODIUM SERPL-SCNC: 140 MMOL/L (ref 135–145)
WBC # BLD AUTO: 12.5 K/UL (ref 4.8–10.8)

## 2019-08-01 PROCEDURE — A9270 NON-COVERED ITEM OR SERVICE: HCPCS | Performed by: HOSPITALIST

## 2019-08-01 PROCEDURE — 700102 HCHG RX REV CODE 250 W/ 637 OVERRIDE(OP): Performed by: INTERNAL MEDICINE

## 2019-08-01 PROCEDURE — 85025 COMPLETE CBC W/AUTO DIFF WBC: CPT

## 2019-08-01 PROCEDURE — 99217 PR OBSERVATION CARE DISCHARGE: CPT | Performed by: INTERNAL MEDICINE

## 2019-08-01 PROCEDURE — A9270 NON-COVERED ITEM OR SERVICE: HCPCS | Performed by: INTERNAL MEDICINE

## 2019-08-01 PROCEDURE — 80053 COMPREHEN METABOLIC PANEL: CPT

## 2019-08-01 PROCEDURE — 700102 HCHG RX REV CODE 250 W/ 637 OVERRIDE(OP): Performed by: HOSPITALIST

## 2019-08-01 PROCEDURE — G0378 HOSPITAL OBSERVATION PER HR: HCPCS

## 2019-08-01 PROCEDURE — 36415 COLL VENOUS BLD VENIPUNCTURE: CPT

## 2019-08-01 RX ORDER — OXYCODONE HYDROCHLORIDE 5 MG/1
5 TABLET ORAL ONCE
Status: COMPLETED | OUTPATIENT
Start: 2019-08-01 | End: 2019-08-01

## 2019-08-01 RX ORDER — POLYETHYLENE GLYCOL 3350 17 G/17G
17 POWDER, FOR SOLUTION ORAL
Qty: 30 EACH | Refills: 3 | COMMUNITY
Start: 2019-08-01 | End: 2019-08-23

## 2019-08-01 RX ADMIN — OXYCODONE HYDROCHLORIDE 5 MG: 5 TABLET ORAL at 09:22

## 2019-08-01 RX ADMIN — ACETAMINOPHEN 650 MG: 325 TABLET, FILM COATED ORAL at 05:30

## 2019-08-01 RX ADMIN — OXYCODONE HYDROCHLORIDE 5 MG: 5 TABLET ORAL at 08:05

## 2019-08-01 ASSESSMENT — LIFESTYLE VARIABLES
TOTAL SCORE: 0
EVER FELT BAD OR GUILTY ABOUT YOUR DRINKING: NO
EVER HAD A DRINK FIRST THING IN THE MORNING TO STEADY YOUR NERVES TO GET RID OF A HANGOVER: NO
TOTAL SCORE: 0
CONSUMPTION TOTAL: NEGATIVE
HOW MANY TIMES IN THE PAST YEAR HAVE YOU HAD 5 OR MORE DRINKS IN A DAY: 0
TOTAL SCORE: 0
CONSUMPTION TOTAL: INCOMPLETE
EVER FELT BAD OR GUILTY ABOUT YOUR DRINKING: NO
TOTAL SCORE: 0
ON A TYPICAL DAY WHEN YOU DRINK ALCOHOL HOW MANY DRINKS DO YOU HAVE: 0
TOTAL SCORE: 0
EVER HAD A DRINK FIRST THING IN THE MORNING TO STEADY YOUR NERVES TO GET RID OF A HANGOVER: NO
HAVE PEOPLE ANNOYED YOU BY CRITICIZING YOUR DRINKING: NO
TOTAL SCORE: 0
HAVE PEOPLE ANNOYED YOU BY CRITICIZING YOUR DRINKING: NO
EVER FELT BAD OR GUILTY ABOUT YOUR DRINKING: NO
AVERAGE NUMBER OF DAYS PER WEEK YOU HAVE A DRINK CONTAINING ALCOHOL: 0
ALCOHOL_USE: NO
CONSUMPTION TOTAL: INCOMPLETE
ALCOHOL_USE: NO
HAVE YOU EVER FELT YOU SHOULD CUT DOWN ON YOUR DRINKING: NO
TOTAL SCORE: 0
TOTAL SCORE: 0
ALCOHOL_USE: NO
ALCOHOL_USE: NO
EVER HAD A DRINK FIRST THING IN THE MORNING TO STEADY YOUR NERVES TO GET RID OF A HANGOVER: NO
TOTAL SCORE: 0
HAVE PEOPLE ANNOYED YOU BY CRITICIZING YOUR DRINKING: NO
HAVE YOU EVER FELT YOU SHOULD CUT DOWN ON YOUR DRINKING: NO
HAVE YOU EVER FELT YOU SHOULD CUT DOWN ON YOUR DRINKING: NO

## 2019-08-01 ASSESSMENT — COGNITIVE AND FUNCTIONAL STATUS - GENERAL
SUGGESTED CMS G CODE MODIFIER DAILY ACTIVITY: CK
CLIMB 3 TO 5 STEPS WITH RAILING: A LITTLE
DRESSING REGULAR UPPER BODY CLOTHING: A LITTLE
TURNING FROM BACK TO SIDE WHILE IN FLAT BAD: A LITTLE
MOBILITY SCORE: 20
STANDING UP FROM CHAIR USING ARMS: A LITTLE
PERSONAL GROOMING: A LITTLE
DAILY ACTIVITIY SCORE: 19
DRESSING REGULAR LOWER BODY CLOTHING: A LITTLE
MOVING FROM LYING ON BACK TO SITTING ON SIDE OF FLAT BED: A LITTLE
EATING MEALS: A LITTLE
SUGGESTED CMS G CODE MODIFIER MOBILITY: CJ
HELP NEEDED FOR BATHING: A LITTLE

## 2019-08-01 ASSESSMENT — PATIENT HEALTH QUESTIONNAIRE - PHQ9
1. LITTLE INTEREST OR PLEASURE IN DOING THINGS: NOT AT ALL
SUM OF ALL RESPONSES TO PHQ9 QUESTIONS 1 AND 2: 0
2. FEELING DOWN, DEPRESSED, IRRITABLE, OR HOPELESS: NOT AT ALL

## 2019-08-01 NOTE — PROGRESS NOTES
Received report from Tim KHANNA. Assumed care. This pt is AOx4,   Reports pain in shoulder, will medicate per MAR, Patient and RN discussed plan of care including weaning oxygen, IS use, pain management, possible discharge today: questions answered. Chart reviewed. Call light in place, fall precautions in place, patient educated on importance of calling for assistance. No additional needs at this time.

## 2019-08-01 NOTE — DISCHARGE SUMMARY
Discharge Summary    CHIEF COMPLAINT ON ADMISSION  Right shoulder pain    Reason for Admission  M19.011     Admission Date  7/31/2019    CODE STATUS  Full    HPI & HOSPITAL COURSE  This is a 44 y.o. male with a history of childhood exercise-induced asthma, here with right shoulder pain and underwent an arthroscopic evaluation where he required regional anesthesia with a nerve block.  He was found to have a SLAP tear and right shoulder distal clavicle arthritis and inflammation as well as right shoulder impingement secondary to the rotator cuff tear.  Postoperatively, he was requiring 2 to 3 L of oxygen to maintain his saturations therefore was admitted for supplemental oxygen and to evaluate his hypoxia.  It was thought very likely related to anesthesia in the setting of history of asthma as his chest x-ray was clear.  He likely had atelectasis secondary to his regional nerve block as well.  As his nerve block wore off he was able to use incentive spirometer and his oxygen saturations improved.  He was weaned to room air.    He had increased right shoulder pain following his nerve block wearing off therefore this was treated with oxycodone.  He was discharged with a prescription for Percocet and will take additional Tylenol and ibuprofen as needed, staying under the recommended 4 g/day of Tylenol.       Therefore, he is discharged in good and stable condition to home with close outpatient follow-up.    The patient recovered much more quickly than anticipated on admission.    Discharge Date  8/1/2019    FOLLOW UP ITEMS POST DISCHARGE  Follow-up with orthopedic surgery for postoperative care    DISCHARGE DIAGNOSES  Principal Problem:    Hypoxemia POA: Unknown  Active Problems:    Anxiety POA: Yes    Chronic bilateral low back pain without sciatica POA: Yes    Rotator cuff disorder POA: Unknown  Resolved Problems:    * No resolved hospital problems. *      FOLLOW UP  Suresh Brock M.D.  [id number]  [facility] [address]  [phone]    In 10 days      Bello Choi, P.A.-C.  [address]  [address]  [place] [id number] [phone]    Schedule an appointment as soon as possible for a visit in 1 week  Hospital follow-up appointment with PCP      MEDICATIONS ON DISCHARGE     Medication List      START taking these medications      Instructions   polyethylene glycol/lytes Pack  Commonly known as:  MIRALAX   Take 1 Packet by mouth 1 time daily as needed (if sennosides and docusate ineffective after 24 hours).  Dose:  17 g        CONTINUE taking these medications      Instructions   acetaminophen 500 MG Tabs  Commonly known as:  TYLENOL   Take 500 mg by mouth every 6 hours as needed.  Dose:  500 mg     ALPRAZolam 0.25 MG Tabs  Start taking on:  8/11/2019  Commonly known as:  XANAX   TAKE 1 TABLET BY MOUTH EVERY DAY AS NEEDED FOR ANXIETY FOR UP TO 30 DAYS     ibuprofen 200 MG Tabs  Commonly known as:  MOTRIN   Take 200 mg by mouth every 6 hours as needed.  Dose:  200 mg     MELATIN PO   Take  by mouth every day.     MULTI-VITAMIN PO   Take  by mouth every day.     zolpidem 5 MG Tabs  Commonly known as:  AMBIEN   Take 5 mg by mouth as needed for Sleep.  Dose:  5 mg            Allergies  No Known Allergies    DIET  As tolerated    ACTIVITY  Activity of right shoulder determined by orthopedic surgery    CONSULTATIONS  Orthopedic surgery- Dr. Brock    PROCEDURES  7/31/2019 right shoulder arthroscopic evaluation, rotator cuff repair, arthroscopic biceps tenotomy  7/31/2019 regional anesthesia of right shoulder    LABORATORY  Lab Results   Component Value Date    SODIUM 140 08/01/2019    POTASSIUM 4.5 08/01/2019    CHLORIDE 108 08/01/2019    CO2 25 08/01/2019    GLUCOSE 111 (H) 08/01/2019    BUN 10 08/01/2019    CREATININE 1.00 08/01/2019    CREATININE 0.81 01/22/2010    GLOMRATE >59 01/22/2010        Lab Results   Component Value Date    WBC 12.5 (H) 08/01/2019    WBC 7.3 01/22/2010    HEMOGLOBIN 14.2  08/01/2019    HEMATOCRIT 42.5 08/01/2019    PLATELETCT 230 08/01/2019        Total time of the discharge process exceeds 40 minutes.

## 2019-08-01 NOTE — PROGRESS NOTES
Discharging patient home per MD order.  Pt demonstrated understanding of discharge instructions, follow up appointments, home medications, prescriptions. Ambulating without assistance, voiding without difficulty, pain well controlled, tolerating oral medications, tolerating diet. Pt verbalized understanding of discharge instructions and educational handouts, all questions answered.  Pt discharged off unit with hospital escort at 0935.

## 2019-08-01 NOTE — ASSESSMENT & PLAN NOTE
Most likely as a result of phrenic nerve dysfunction due to a scalene nerve block, discussed case with anesthesiology, they reported that this is a transient process and will resolve.  However patient will be monitored closely on pulse oximetry continuously.  Continue RT protocol, duo nebs, Pep therapy if warranted, and incentive spirometry.   We will try to wean him off oxygen  2-view CXR ordered and pending

## 2019-08-01 NOTE — CONSULTS
Hospital Medicine Consultation Note    Date of Service  7/31/2019    Primary Care Physician  Bello Choi P.A.-C.    Requesting physician for consultation Suresh Brock M.D, Oziel Shah M.D.    Code Status  Full Code    Chief Complaint  Post op hypoxemia    History of Presenting Illness  Joaquina is a very pleasant 44 y.o. male with a past medical history of asthma, right shoulder rotator cuff injury underwent a rotator cuff repair 7/31/2019, postoperatively patient had a scalene block hence it is acceptable that patient may have a temporary phrenic nerve block, and as a result patient may have become hypoxic.  Nevertheless patient is awake alert, talking in complete sentences without any shortness of breath.  At this point patient will be monitored overnight, will try to wean off his oxygen as tolerated.    Review of Systems  Review of Systems   Constitutional: Negative for chills, fever and malaise/fatigue.   HENT: Negative for congestion, hearing loss, sore throat and tinnitus.    Eyes: Negative for blurred vision, double vision, photophobia and pain.   Respiratory: Positive for cough. Negative for hemoptysis, sputum production, shortness of breath and stridor.    Cardiovascular: Negative for chest pain, palpitations, orthopnea, claudication and PND.   Gastrointestinal: Negative for blood in stool, constipation, heartburn, melena, nausea and vomiting.   Genitourinary: Negative for dysuria, frequency and urgency.   Musculoskeletal: Negative for back pain, myalgias and neck pain.        Right arm in brace   Neurological: Positive for weakness. Negative for dizziness, tingling, tremors, sensory change, speech change and headaches.   Psychiatric/Behavioral: Negative for depression, memory loss and suicidal ideas. The patient is not nervous/anxious.        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    Family History  family history includes Heart Disease in his father; Hyperlipidemia in his father; Hypertension in his father.    Social History   reports that he has never smoked. He has never used smokeless tobacco. He reports that he drinks about 1.0 oz of alcohol per week. He reports that he has current or past drug history.    Allergies  No Known Allergies    Medications  Prior to Admission medications    Medication Sig Start Date End Date Taking? Authorizing Provider   acetaminophen (TYLENOL) 500 MG Tab Take 500 mg by mouth every 6 hours as needed.   Yes Physician Outpatient   zolpidem (AMBIEN) 5 MG Tab Take 5 mg by mouth as needed for Sleep.   Yes Physician Outpatient   Multiple Vitamin (MULTI-VITAMIN PO) Take  by mouth every day.   Yes Physician Outpatient   Melatonin-Pyridoxine (MELATIN PO) Take  by mouth every day.   Yes Physician Outpatient   ibuprofen (MOTRIN) 200 MG Tab Take 200 mg by mouth every 6 hours as needed.   Yes Physician Outpatient   ALPRAZolam (XANAX) 0.25 MG Tab TAKE 1 TABLET BY MOUTH EVERY DAY AS NEEDED FOR ANXIETY FOR UP TO 30 DAYS 8/11/19 8/11/19  Bello Choi P.A.-C.       Physical Exam  Temp:  [36 °C (96.8 °F)-36.3 °C (97.3 °F)] 36.1 °C (97 °F)  Pulse:  [52-76] 69  Resp:  [16-18] 18  BP: ()/() 121/78  SpO2:  [78 %-98 %] 94 %  Physical Exam   Constitutional: He is oriented to person, place, and time. He appears well-developed and well-nourished. No distress.   HENT:   Head: Normocephalic and atraumatic.   Mouth/Throat: No oropharyngeal exudate.   Eyes: Pupils are equal, round, and reactive to light. Conjunctivae are normal. Right eye exhibits no discharge. No scleral icterus.   Neck: Neck supple. No JVD present. No thyromegaly present.   Cardiovascular: Normal rate and intact distal pulses.   No murmur heard.  Pulses:       Dorsalis  pedis pulses are 2+ on the right side, and 2+ on the left side.   Cap refill < 3 s   Pulmonary/Chest: Effort normal and breath sounds normal. No stridor. No respiratory distress. He has no wheezes. He has no rales.   Good air movement bilateral lung bases   Abdominal: Soft. Bowel sounds are normal. He exhibits no distension. There is no tenderness. There is no rebound.   Musculoskeletal: He exhibits no edema.   Right arm is in a brace able to move all his fingers, capillary refills intact less than 3 seconds   Neurological: He is alert and oriented to person, place, and time. No cranial nerve deficit.   Skin: Skin is warm and dry. He is not diaphoretic. No erythema.   Psychiatric: He has a normal mood and affect. His behavior is normal. Thought content normal.   Vitals reviewed.      Laboratory:          No results for input(s): ALTSGPT, ASTSGOT, ALKPHOSPHAT, TBILIRUBIN, DBILIRUBIN, GAMMAGT, AMYLASE, LIPASE, ALB, PREALBUMIN, GLUCOSE in the last 72 hours.            Urinalysis:          Imaging:  No orders to display       Assessment/Plan:  I anticipate this patient is appropriate for observation status at this time.    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  However patient will be monitored closely on pulse oximetry continuously.  Continue RT protocol, duo nebs, Pep therapy if warranted, and incentive spirometry.   We will try to wean him off oxygen  2-view CXR ordered and pending     Rotator cuff disorder  Assessment & Plan  S/p arthroscopic rotator cuff repair  Ortho following  Pain control      Chronic bilateral low back pain without sciatica- (present on admission)  Assessment & Plan  Prn tylenol.     Anxiety- (present on admission)  Assessment & Plan  Resume home dose of prn xanax      VTE prophylaxis: Prophylaxis: SCDs

## 2019-08-01 NOTE — PROGRESS NOTES
Pt alert and awake, had no c/o any discomfort at this time. Seen by MD , no new orders at this time. Pt request of snacks for tonight. Will paged MD .

## 2019-08-01 NOTE — DISCHARGE INSTRUCTIONS
Discharge Instructions    Discharged to home by car with relative. Discharged via wheelchair, hospital escort: Yes.  Special equipment needed: Not Applicable    Be sure to schedule a follow-up appointment with your primary care doctor or any specialists as instructed.     Discharge Plan:   Diet Plan: Discussed  Activity Level: Discussed  Confirmed Follow up Appointment: Appointment Scheduled  Confirmed Symptoms Management: Discussed  Medication Reconciliation Updated: Yes  Influenza Vaccine Indication: Patient Refuses    I understand that a diet low in cholesterol, fat, and sodium is recommended for good health. Unless I have been given specific instructions below for another diet, I accept this instruction as my diet prescription.   Other diet: regular as tolerated    Special Instructions: Discharge instructions for the Orthopedic Patient    Follow up with Primary Care Physician within 2 weeks of discharge to home, regarding:  Review of medications and diagnostic testing.  Surveillance for medical complications.  Workup and treatment of osteoporosis, if appropriate.     -Is this a Joint Replacement patient? No    -Is this patient being discharged with medication to prevent blood clots?  No    · Is patient discharged on Warfarin / Coumadin?   No     Depression / Suicide Risk    As you are discharged from this [facility] facility, it is important to learn how to keep safe from harming yourself.    Recognize the warning signs:  · Abrupt changes in personality, positive or negative- including increase in energy   · Giving away possessions  · Change in eating patterns- significant weight changes-  positive or negative  · Change in sleeping patterns- unable to sleep or sleeping all the time   · Unwillingness or inability to communicate  · Depression  · Unusual sadness, discouragement and loneliness  · Talk of wanting to die  · Neglect of personal appearance   · Rebelliousness- reckless behavior  · Withdrawal from  people/activities they love  · Confusion- inability to concentrate     If you or a loved one observes any of these behaviors or has concerns about self-harm, here's what you can do:  · Talk about it- your feelings and reasons for harming yourself  · Remove any means that you might use to hurt yourself (examples: pills, rope, extension cords, firearm)  · Get professional help from the community (Mental Health, Substance Abuse, psychological counseling)  · Do not be alone:Call your Safe Contact- someone whom you trust who will be there for you.  · Call your local CRISIS HOTLINE 606-0895 or 461-724-7738  · Call your local Children's Mobile Crisis Response Team [place] (339) 363-1331 or www.OpenRoute  · Call the toll free National Suicide Prevention Hotlines   · National Suicide Prevention Lifeline 362-603-BXPE (7769)  · [employer] Network 800-SUICIDE (421-2932)      ACTIVITY: Rest and take it easy for the first 24 hours.  A responsible adult is recommended to remain with you during that time.  It is normal to feel sleepy.  We encourage you to not do anything that requires balance, judgment or coordination.    MILD FLU-LIKE SYMPTOMS ARE NORMAL. YOU MAY EXPERIENCE GENERALIZED MUSCLE ACHES, THROAT IRRITATION, HEADACHE AND/OR SOME NAUSEA.    FOR 24 HOURS DO NOT:  Drive, operate machinery or run household appliances.  Drink beer or alcoholic beverages.   Make important decisions or sign legal documents.    SPECIAL INSTRUCTIONS: Non weight bearing  Ice  Keep brace intact    DIET: To avoid nausea, slowly advance diet as tolerated, avoiding spicy or greasy foods for the first day.  Add more substantial food to your diet according to your physician's instructions.  INCREASE FLUIDS AND FIBER TO AVOID CONSTIPATION.    SURGICAL DRESSING/BATHING: Keep clean dry and intact per Dr Brock instructions    FOLLOW-UP APPOINTMENT:  A follow-up appointment should be arranged with your doctor; call to schedule.    You  should CALL YOUR PHYSICIAN if you develop:  Fever greater than 101 degrees F.  Pain not relieved by medication, or persistent nausea or vomiting.  Excessive bleeding (blood soaking through dressing) or unexpected drainage from the wound.  Extreme redness or swelling around the incision site, drainage of pus or foul smelling drainage.  Inability to urinate or empty your bladder within 8 hours.  Problems with breathing or chest pain.    You should call 911 if you develop problems with breathing or chest pain.  If you are unable to contact your doctor or surgical center, you should go to the nearest emergency room or urgent care center.  Physician's telephone #: [phone]    If any questions arise, call your doctor.  If your doctor is not available, please feel free to call the Surgical Center at [phone].  The Center is open Monday through Friday from 7AM to 7PM.  You can also call the [employer] HOTLINE open 24 hours/day, 7 days/week and speak to a nurse at [phone], or toll free at [phone].    A registered nurse may call you a few days after your surgery to see how you are doing after your procedure.    MEDICATIONS: Resume taking daily medication.  Take prescribed pain medication with food.  If no medication is prescribed, you may take non-aspirin pain medication if needed.  PAIN MEDICATION CAN BE VERY CONSTIPATING.  Take a stool softener or laxative such as senokot, pericolace, or milk of magnesia if needed.    Prescription at home  Last pain medication given at     If your physician has prescribed pain medication that includes Acetaminophen (Tylenol), do not take additional Acetaminophen (Tylenol) while taking the prescribed medication.    Depression / Suicide Risk    As you are discharged from this [facility] facility, it is important to learn how to keep safe from harming yourself.    Recognize the warning signs:  · Abrupt changes in personality, positive or negative- including increase in energy    · Giving away possessions  · Change in eating patterns- significant weight changes-  positive or negative  · Change in sleeping patterns- unable to sleep or sleeping all the time   · Unwillingness or inability to communicate  · Depression  · Unusual sadness, discouragement and loneliness  · Talk of wanting to die  · Neglect of personal appearance   · Rebelliousness- reckless behavior  · Withdrawal from people/activities they love  · Confusion- inability to concentrate     If you or a loved one observes any of these behaviors or has concerns about self-harm, here's what you can do:  · Talk about it- your feelings and reasons for harming yourself  · Remove any means that you might use to hurt yourself (examples: pills, rope, extension cords, firearm)  · Get professional help from the community (Mental Health, Substance Abuse, psychological counseling)  · Do not be alone:Call your Safe Contact- someone whom you trust who will be there for you.  · Call your local CRISIS HOTLINE 969-3280 or 675-518-2422  · Call your local Children's Mobile Crisis Response Team [place] (130) 406-0140 or wwwCDNetworks  · Call the toll free National Suicide Prevention Hotlines   · National Suicide Prevention Lifeline 551-664-JNSI (2283)  National Hope Line Network 800-SUICIDE (806-7506)    Peripheral Nerve Block Discharge Instructions from Same Day Surgery and Inpatient :    What to Expect - Upper Extremity  · You may experience numbness and weakness in shoulder, arm and hand  on the same side as your surgery  · This is normal. For some people, this may be an unpleasant sensation. Be very careful with your numb limb  · Ask for help when you need it  Shoulder Surgery Side Effects  · In addition to numbness and weakness you may experience other symptoms  · Other nerves that are close to those nerves injected can also be affected by local anesthesia  · You may experience a hoarseness in your voice  · Your breathing may feel  "different  · You may also notice drooping of your eyelid, pupil constriction, and decreased sweating, on the side of your surgery  · All of these side effects are normal and will resolve when the local anesthetic wears off   Prevent Injury  · Protect the limb like a baby  · Beware of exposing your limb to extreme heat or cold or trauma  · The limb may be injured without you noticing because it is numb  · Keep the limb elevated whenever possible  · Do not sleep on the limb  · Change the position of the limb regularly  · Avoid putting pressure on your surgical limb  Pain Control  · The initial block on the day of surgery will make your extremity feel \"numb\"  · Any consecutive injection including prior to discharge from the hospital will make your extremity feel \"numb\"  · You may feel an aching or burning when the local anesthesia starts to wear off  · Take pain pills as prescribed by your surgeon  · Call your surgeon or anesthesiologist if you do not have adequate pain control      Peripheral Nerve Block Discharge Instructions from Same Day Surgery and Inpatient :    · lf  What to Expect - Upper Extremity  · You may experience numbness and weakness in shoulder, arm and hand  on the same side as your surgery  · This is normal. For some people, this may be an unpleasant sensation. Be very careful with your numb limb  · Ask for help when you need it  Shoulder Surgery Side Effects  · In addition to numbness and weakness you may experience other symptoms  · Other nerves that are close to those nerves injected can also be affected by local anesthesia  · You may experience a hoarseness in your voice  · Your breathing may feel different  · You may also notice drooping of your eyelid, pupil constriction, and decreased sweating, on the side of your surgery  · All of these side effects are normal and will resolve when the local anesthetic wears off   Prevent Injury  · Protect the limb like a baby  · Beware of exposing your limb " "to extreme heat or cold or trauma  · The limb may be injured without you noticing because it is numb  · Keep the limb elevated whenever possible  · Do not sleep on the limb  · Change the position of the limb regularly  · Avoid putting pressure on your surgical limb  Pain Control  · The initial block on the day of surgery will make your extremity feel \"numb\"  · Any consecutive injection including prior to discharge from the hospital will make your extremity feel \"numb\"  · You may feel an aching or burning when the local anesthesia starts to wear off  · Take pain pills as prescribed by your surgeon  · Call your surgeon or anesthesiologist if you do not have adequate pain control  ·   "

## 2019-08-01 NOTE — OR NURSING
1613- Patient to stage II from the PACU following surgery to patient right arm/shoulder. Nursing assistant aided patient with dressing at this time.     1631- Patient with complaints of nausea treated with Zofran as charted in medication administration record.     1700- SPO2 noted at 78% on room air while patient asleep. Patient placed on oxygen infusing via nasal cannula at 2 liters per minute. Incentive spirometer in use at this time. Patient able to pull 2700 on incentive spirometer.     1730- Patient asleep in chair with oxygen infusing via nasal cannula with an SPO2 reading of 94%. Oxygen removed.     1800- SPO2 reading of 74% noted with patient on room air. Patient awaken and walked with staff approximately 200 feet. Incentive spirometer in use.     1830- Oxygen infusing via oxy mask at 4 liters per minute. SPO2 91%-94%    1845- Patient placed on room air. SPO2 reading of 79% noted. Anesthesiologist (Pablo) called and notified.     1847- Anesthesiologist states he will call patient physician and notify of possible pending admission. Albuterol breathing treatment ordered and to be given.     1915- Call received from patient physician Suresh Brock. Dr. Brock notified of patient oxygenation status and Dr. Brock ok for patient admission along with anesthesiologist Dr. Shah. Patient and family updated on patient probable admission status into the hospital over night to monitor oxygen saturation.     1930- After breathing treatment and patient resting in chair SPO2 reading of 80% noted.     1942- Oxygen infusing via oxy mask at 4 liters per minute. Patient resting with family at side. Patient up with staff assistance to restroom.     2031- Report called to general surgical unit. Patient transferred.

## 2019-08-06 ENCOUNTER — HOSPITAL ENCOUNTER (OUTPATIENT)
Dept: RADIOLOGY | Facility: MEDICAL CENTER | Age: 44
End: 2019-08-06
Attending: ORTHOPAEDIC SURGERY
Payer: COMMERCIAL

## 2019-08-06 DIAGNOSIS — M75.41 IMPINGEMENT SYNDROME OF RIGHT SHOULDER: ICD-10-CM

## 2019-08-06 PROCEDURE — 73030 X-RAY EXAM OF SHOULDER: CPT | Mod: RT

## 2019-08-23 ENCOUNTER — OFFICE VISIT (OUTPATIENT)
Dept: MEDICAL GROUP | Facility: MEDICAL CENTER | Age: 44
End: 2019-08-23
Payer: COMMERCIAL

## 2019-08-23 VITALS
WEIGHT: 200.18 LBS | BODY MASS INDEX: 28.66 KG/M2 | HEIGHT: 70 IN | RESPIRATION RATE: 16 BRPM | SYSTOLIC BLOOD PRESSURE: 160 MMHG | HEART RATE: 76 BPM | OXYGEN SATURATION: 96 % | TEMPERATURE: 97.5 F | DIASTOLIC BLOOD PRESSURE: 96 MMHG

## 2019-08-23 DIAGNOSIS — F51.01 PRIMARY INSOMNIA: ICD-10-CM

## 2019-08-23 DIAGNOSIS — M67.911 DISORDER OF RIGHT ROTATOR CUFF: ICD-10-CM

## 2019-08-23 PROCEDURE — 99214 OFFICE O/P EST MOD 30 MIN: CPT | Performed by: PHYSICIAN ASSISTANT

## 2019-08-23 RX ORDER — ZOLPIDEM TARTRATE 5 MG/1
5 TABLET ORAL NIGHTLY PRN
Qty: 30 TAB | Refills: 2 | Status: SHIPPED | OUTPATIENT
Start: 2019-08-23 | End: 2019-10-21 | Stop reason: SDUPTHER

## 2019-08-23 NOTE — ASSESSMENT & PLAN NOTE
Doing well after his surgery.  Follows with orthopedics.  Is taking narcotics for the pain.  Takes also Tylenol.  Return to work.  Is using his left arm at [employer].  States that there is no pain he being out of work.

## 2019-08-23 NOTE — ASSESSMENT & PLAN NOTE
This is a 44-year-old male who is here today to get a refill of his insomnia medication.  Takes 5 mg nightly.  Medication is effective.  Recently had surgery on his right shoulder.  Performed by Dr. Brock.  It was not just a simple rotator cuff surgery.  He had rotator cuff repair as well as open biceps tenodesis, tenotomy, clavicle distal excision and more intervention.  After the surgery is been difficult for him to sleep because he sleeps on his side.  Is also taking melatonin.

## 2019-08-23 NOTE — PROGRESS NOTES
"Subjective:   Genaro Kunz is a 44 y.o. male here today for insomnia and recent right shoulder surgery.    Primary insomnia  This is a 44-year-old male who is here today to get a refill of his insomnia medication.  Takes 5 mg nightly.  Medication is effective.  Recently had surgery on his right shoulder.  Performed by Dr. Brock.  It was not just a simple rotator cuff surgery.  He had rotator cuff repair as well as open biceps tenodesis, tenotomy, clavicle distal excision and more intervention.  After the surgery is been difficult for him to sleep because he sleeps on his side.  Is also taking melatonin.    Rotator cuff disorder  Doing well after his surgery.  Follows with orthopedics.  Is taking narcotics for the pain.  Takes also Tylenol.  Return to work.  Is using his left arm at [employer].  States that there is no pain he being out of work.       Current medicines (including changes today)  Current Outpatient Medications   Medication Sig Dispense Refill   • zolpidem (AMBIEN) 5 MG Tab Take 1 Tab by mouth at bedtime as needed for Sleep for up to 30 days. 30 Tab 2   • acetaminophen (TYLENOL) 500 MG Tab Take 500 mg by mouth every 6 hours as needed.     • Multiple Vitamin (MULTI-VITAMIN PO) Take  by mouth every day.     • Melatonin-Pyridoxine (MELATIN PO) Take  by mouth every day.       No current facility-administered medications for this visit.      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    Social History and Family History were reviewed and updated.    ROS   No chest pain, no shortness of breath, no abdominal pain and all other systems were reviewed and are negative.       Objective:     /96 (BP Location: Left arm, Patient Position: Sitting, BP Cuff Size: Adult)   Pulse 76   Temp 36.4 °C (97.5 °F) (Temporal)   Resp 16   Ht 1.778 m (5' 10\")   Wt 90.8 kg (200 lb 2.8 oz)   SpO2 96%  Body mass " index is 28.72 kg/m².   Physical Exam:  Constitutional: Alert, no distress.  Skin: Warm, dry, good turgor, no rashes in visible areas.  Eye: Equal, round and reactive, conjunctiva clear, lids normal.  ENMT: Lips without lesions, good dentition, oropharynx clear.  Neck: Trachea midline, no masses.   Lymph: No cervical or supraclavicular lymphadenopathy  Respiratory: Unlabored respiratory effort, lungs appear clear, no wheezes.  Cardiovascular: Regular rate and rhythm, no edema.  Muscular skeletal: Right shoulder in a brace.  Psych: Alert and oriented x3, normal affect and mood.        Assessment and Plan:   The following treatment plan was discussed    1. Primary insomnia  On a condition.  Stable.   reviewed.  Medication appropriate.  Renewed Ambien 5 mg.  Provided a 90-day supply.  Xanax not renewed today.  Advised follow-up in 3 months for refills.  - zolpidem (AMBIEN) 5 MG Tab; Take 1 Tab by mouth at bedtime as needed for Sleep for up to 30 days.  Dispense: 30 Tab; Refill: 2    2. Disorder of right rotator cuff  Chronic condition with recent intervention and surgery.  Follow with PT.  Follow with orthopedics.  Continue medications as directed.    Patient was seen for 25 minutes face to face of which > 50% of appointment time was spent on counseling and coordination of care regarding the above.    Followup: Return in about 3 months (around 11/23/2019).    Please note that this dictation was created using voice recognition software. I have made every reasonable attempt to correct obvious errors, but I expect that there are errors of grammar and possibly content that I did not discover before finalizing the note.

## 2019-10-21 ENCOUNTER — OFFICE VISIT (OUTPATIENT)
Dept: MEDICAL GROUP | Facility: MEDICAL CENTER | Age: 44
End: 2019-10-21
Payer: COMMERCIAL

## 2019-10-21 VITALS
HEIGHT: 70 IN | RESPIRATION RATE: 16 BRPM | BODY MASS INDEX: 28.35 KG/M2 | OXYGEN SATURATION: 96 % | TEMPERATURE: 97.5 F | SYSTOLIC BLOOD PRESSURE: 160 MMHG | WEIGHT: 198 LBS | DIASTOLIC BLOOD PRESSURE: 82 MMHG | HEART RATE: 93 BPM

## 2019-10-21 DIAGNOSIS — K21.9 GASTROESOPHAGEAL REFLUX DISEASE, ESOPHAGITIS PRESENCE NOT SPECIFIED: ICD-10-CM

## 2019-10-21 DIAGNOSIS — R49.0 VOICE HOARSENESS: ICD-10-CM

## 2019-10-21 DIAGNOSIS — M25.511 CHRONIC RIGHT SHOULDER PAIN: ICD-10-CM

## 2019-10-21 DIAGNOSIS — F51.01 PRIMARY INSOMNIA: ICD-10-CM

## 2019-10-21 DIAGNOSIS — G89.29 CHRONIC RIGHT SHOULDER PAIN: ICD-10-CM

## 2019-10-21 DIAGNOSIS — F41.9 ANXIETY: ICD-10-CM

## 2019-10-21 PROBLEM — R09.02 HYPOXEMIA: Status: RESOLVED | Noted: 2019-07-31 | Resolved: 2019-10-21

## 2019-10-21 PROCEDURE — 99214 OFFICE O/P EST MOD 30 MIN: CPT | Performed by: PHYSICIAN ASSISTANT

## 2019-10-21 RX ORDER — ALPRAZOLAM 0.25 MG/1
TABLET ORAL
Qty: 30 TAB | Refills: 1 | Status: SHIPPED | OUTPATIENT
Start: 2019-10-21 | End: 2019-12-03 | Stop reason: SDUPTHER

## 2019-10-21 RX ORDER — ZOLPIDEM TARTRATE 5 MG/1
5 TABLET ORAL NIGHTLY PRN
Qty: 30 TAB | Refills: 2 | Status: SHIPPED | OUTPATIENT
Start: 2019-10-21 | End: 2019-12-02 | Stop reason: SDUPTHER

## 2019-10-21 RX ORDER — ZOLPIDEM TARTRATE 5 MG/1
TABLET ORAL
Refills: 2 | COMMUNITY
Start: 2019-09-25 | End: 2019-10-21

## 2019-10-21 NOTE — ASSESSMENT & PLAN NOTE
Complains of voice being hoarse over the past few weeks.  Believes it may be secondary to his job change.  Will talk more often.  Does have a history of reflux.  Takes what he believes is omeprazole after he eats.  He denies any postnasal drainage.  No fevers.

## 2019-10-21 NOTE — ASSESSMENT & PLAN NOTE
Has chronic anxiety.  Requesting refill today as well as Xanax.  Takes 0.25 mg when needed.  Has 5 tablets left.  Last prescription refill was a couple months ago.  Anxiety is stable.  Got a promotion a day in .

## 2019-10-21 NOTE — ASSESSMENT & PLAN NOTE
This is a 44-year-old male who is here today to discuss insomnia.  Working a night shift.  Working at [employer].  They call it shift D.  Requesting a refill today of Ambien.  Takes 5 mg prior to bed.  Medication is effective.

## 2019-10-21 NOTE — PROGRESS NOTES
Subjective:   Genaro Kunz is a 44 y.o. male here today for insomnia, anxiety, chronic right shoulder pain status post surgery and voice hoarseness with a history of GERD.    Primary insomnia  This is a 44-year-old male who is here today to discuss insomnia.  Working a night shift.  Working at [employer].  They call it shift D.  Requesting a refill today of Ambien.  Takes 5 mg prior to bed.  Medication is effective.    Anxiety  Has chronic anxiety.  Requesting refill today as well as Xanax.  Takes 0.25 mg when needed.  Has 5 tablets left.  Last prescription refill was a couple months ago.  Anxiety is stable.  Got a promotion a day in .    Chronic right shoulder pain  Shoulder is stable after surgery.  Has an appointment soon with his orthopedic specialist Dr. Brock.    Voice hoarseness  Complains of voice being hoarse over the past few weeks.  Believes it may be secondary to his job change.  Will talk more often.  Does have a history of reflux.  Takes what he believes is omeprazole after he eats.  He denies any postnasal drainage.  No fevers.       Current medicines (including changes today)  Current Outpatient Medications   Medication Sig Dispense Refill   • zolpidem (AMBIEN) 5 MG Tab Take 1 Tab by mouth at bedtime as needed for Sleep for up to 30 days. 30 Tab 2   • ALPRAZolam (XANAX) 0.25 MG Tab TAKE 1 TABLET BY MOUTH EVERY DAY AS NEEDED FOR ANXIETY FOR UP TO 30 DAYS 30 Tab 1   • acetaminophen (TYLENOL) 500 MG Tab Take 500 mg by mouth every 6 hours as needed.     • Multiple Vitamin (MULTI-VITAMIN PO) Take  by mouth every day.     • Melatonin-Pyridoxine (MELATIN PO) Take  by mouth every day.       No current facility-administered medications for this visit.      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    Social History and Family History were reviewed and updated.    ROS   No chest pain,  "no shortness of breath, no abdominal pain and all other systems were reviewed and are negative.       Objective:     /82 (BP Location: Left arm, Patient Position: Sitting, BP Cuff Size: Adult)   Pulse 93   Temp 36.4 °C (97.5 °F) (Temporal)   Resp 16   Ht 1.778 m (5' 10\")   Wt 89.8 kg (198 lb)   SpO2 96%  Body mass index is 28.41 kg/m².   Physical Exam:  Constitutional: Alert, no distress.  Skin: Warm, dry, good turgor, no rashes in visible areas.  Eye: Equal, round and reactive, conjunctiva clear, lids normal.  ENMT: Lips without lesions, good dentition, oropharynx clear.  Neck: Trachea midline, no masses.   Lymph: No cervical or supraclavicular lymphadenopathy  Respiratory: Unlabored respiratory effort, lungs appear clear, no wheezes.  Cardiovascular: Regular rate and rhythm.  Psych: Alert and oriented x3, normal affect and mood.        Assessment and Plan:   The following treatment plan was discussed    1. Primary insomnia  Chronic condition.  Stable.   reviewed.  Medication appropriate.  Renewed Ambien 5 mg prior to bedtime.  Provided a 90-day supply.  - zolpidem (AMBIEN) 5 MG Tab; Take 1 Tab by mouth at bedtime as needed for Sleep for up to 30 days.  Dispense: 30 Tab; Refill: 2    2. Anxiety  Chronic condition.  Stable.   reviewed.  Last prescription picked up in August.  Renewed alprazolam 0.25 mg take as needed for anxiety.  Do not take with alprazolam.  Gradually 1 refill with 30 tablets.  - ALPRAZolam (XANAX) 0.25 MG Tab; TAKE 1 TABLET BY MOUTH EVERY DAY AS NEEDED FOR ANXIETY FOR UP TO 30 DAYS  Dispense: 30 Tab; Refill: 1    3. Gastroesophageal reflux disease, esophagitis presence not specified  Chronic condition.  May be uncontrolled given the recent hoarseness.  Continue over-the-counter omeprazole half hour prior to the same meal daily.  Continue to monitor symptoms.  Acute    4. Voice hoarseness  Acute, new onset condition.  Unknown etiology.  Could be overuse or secondary to reflux.  " We will continue to monitor.  Take omeprazole as directed.    5. Chronic right shoulder pain  Chronic condition.  Stable status post surgery.  Follow with orthopedic specialist.      Followup: Return in about 3 months (around 1/21/2020), or if symptoms worsen or fail to improve.    Please note that this dictation was created using voice recognition software. I have made every reasonable attempt to correct obvious errors, but I expect that there are errors of grammar and possibly content that I did not discover before finalizing the note.

## 2019-10-21 NOTE — ASSESSMENT & PLAN NOTE
Shoulder is stable after surgery.  Has an appointment soon with his orthopedic specialist Dr. Brock.

## 2019-10-29 ENCOUNTER — TELEPHONE (OUTPATIENT)
Dept: MEDICAL GROUP | Facility: MEDICAL CENTER | Age: 44
End: 2019-10-29

## 2019-10-30 NOTE — TELEPHONE ENCOUNTER
Called Patients Pharmacy regards to medication: zolpidem (AMBIEN) 5 MG Tab     Insurance is kicking back to pharmacy stating prescription was filled else wear. Checked  and end medication was never filled. Pharmacy stated they have printed copy of medication.   Pharmacy will fill medication  Patient will pay out of pocket and contact insurance    Tim Ciufentes, Med Ass't

## 2019-12-03 DIAGNOSIS — F51.01 PRIMARY INSOMNIA: ICD-10-CM

## 2019-12-03 DIAGNOSIS — F41.9 ANXIETY: ICD-10-CM

## 2019-12-03 RX ORDER — ZOLPIDEM TARTRATE 5 MG/1
5 TABLET ORAL NIGHTLY PRN
Qty: 30 TAB | Refills: 0 | Status: SHIPPED
Start: 2019-12-03 | End: 2020-01-02

## 2019-12-03 RX ORDER — ALPRAZOLAM 0.25 MG/1
TABLET ORAL
Qty: 30 TAB | Refills: 1 | Status: SHIPPED
Start: 2019-12-03 | End: 2020-02-10 | Stop reason: SDUPTHER

## 2020-01-06 RX ORDER — ZOLPIDEM TARTRATE 5 MG/1
TABLET ORAL
Qty: 30 TAB | Refills: 0 | OUTPATIENT
Start: 2020-01-06

## 2020-01-13 RX ORDER — FLUTICASONE PROPIONATE 50 MCG
SPRAY, SUSPENSION (ML) NASAL
Refills: 0 | COMMUNITY
Start: 2019-11-02 | End: 2020-05-19

## 2020-01-13 RX ORDER — NAPROXEN 500 MG/1
TABLET ORAL
Refills: 0 | COMMUNITY
Start: 2019-11-02 | End: 2020-05-19

## 2020-01-13 RX ORDER — ALPRAZOLAM 0.25 MG/1
TABLET ORAL
Refills: 1 | COMMUNITY
Start: 2019-12-03 | End: 2020-02-10

## 2020-01-13 RX ORDER — CETIRIZINE HYDROCHLORIDE 10 MG/1
TABLET ORAL
Refills: 0 | COMMUNITY
Start: 2019-11-02 | End: 2020-05-19

## 2020-01-13 RX ORDER — OSELTAMIVIR PHOSPHATE 75 MG/1
CAPSULE ORAL
Refills: 0 | COMMUNITY
Start: 2019-11-02 | End: 2020-02-10

## 2020-02-10 ENCOUNTER — OFFICE VISIT (OUTPATIENT)
Dept: MEDICAL GROUP | Facility: MEDICAL CENTER | Age: 45
End: 2020-02-10
Payer: COMMERCIAL

## 2020-02-10 VITALS
WEIGHT: 206 LBS | HEIGHT: 70 IN | BODY MASS INDEX: 29.49 KG/M2 | HEART RATE: 90 BPM | OXYGEN SATURATION: 94 % | TEMPERATURE: 97.9 F | DIASTOLIC BLOOD PRESSURE: 78 MMHG | SYSTOLIC BLOOD PRESSURE: 142 MMHG | RESPIRATION RATE: 16 BRPM

## 2020-02-10 DIAGNOSIS — E78.5 HYPERLIPIDEMIA LDL GOAL <100: ICD-10-CM

## 2020-02-10 DIAGNOSIS — J06.9 ACUTE URI: ICD-10-CM

## 2020-02-10 DIAGNOSIS — F41.9 ANXIETY: ICD-10-CM

## 2020-02-10 DIAGNOSIS — I10 ESSENTIAL HYPERTENSION: ICD-10-CM

## 2020-02-10 DIAGNOSIS — F51.01 PRIMARY INSOMNIA: ICD-10-CM

## 2020-02-10 PROBLEM — M67.919 ROTATOR CUFF DISORDER: Status: RESOLVED | Noted: 2019-07-31 | Resolved: 2020-02-10

## 2020-02-10 PROBLEM — R49.0 VOICE HOARSENESS: Status: RESOLVED | Noted: 2019-10-21 | Resolved: 2020-02-10

## 2020-02-10 PROBLEM — M25.511 CHRONIC RIGHT SHOULDER PAIN: Status: RESOLVED | Noted: 2019-01-29 | Resolved: 2020-02-10

## 2020-02-10 PROBLEM — G89.29 CHRONIC RIGHT SHOULDER PAIN: Status: RESOLVED | Noted: 2019-01-29 | Resolved: 2020-02-10

## 2020-02-10 PROCEDURE — 99214 OFFICE O/P EST MOD 30 MIN: CPT | Performed by: PHYSICIAN ASSISTANT

## 2020-02-10 RX ORDER — ZOLPIDEM TARTRATE 5 MG/1
5 TABLET ORAL NIGHTLY PRN
Qty: 30 TAB | Refills: 2 | Status: SHIPPED | OUTPATIENT
Start: 2020-02-10 | End: 2020-05-19 | Stop reason: SDUPTHER

## 2020-02-10 RX ORDER — ALPRAZOLAM 0.25 MG/1
TABLET ORAL
Qty: 30 TAB | Refills: 1 | Status: SHIPPED | OUTPATIENT
Start: 2020-02-10 | End: 2020-02-10

## 2020-02-10 ASSESSMENT — PATIENT HEALTH QUESTIONNAIRE - PHQ9: CLINICAL INTERPRETATION OF PHQ2 SCORE: 0

## 2020-02-10 NOTE — ASSESSMENT & PLAN NOTE
Blood pressure has been high but today it is not as high as it usually is but still hypertensive.  He states typically he can make the excuse a just got off of work.  He usually works night shifts.  Last night he did not going to work.

## 2020-02-10 NOTE — ASSESSMENT & PLAN NOTE
Last cholesterol showed his LDL in the 140s.  Prior to that in the 100s.  Family history of heart disease.

## 2020-02-10 NOTE — ASSESSMENT & PLAN NOTE
This is a 44-year-old male here today to discuss few items.  Chronic history of insomnia.  Takes Ambien 5 mg nightly.  Requesting a refill today.  Now is starting to try to take the medication on workdays.  Takes Benadryl 50 mg which is a little effective.  Has no side effects.  In the past took trazodone but that caused him side effects.

## 2020-02-10 NOTE — ASSESSMENT & PLAN NOTE
Chronic, intermittent condition.  Takes Xanax as needed.  Does not need a refill today.  Has plenty of tablets left.  Is doing with his new position at [employer].

## 2020-02-10 NOTE — ASSESSMENT & PLAN NOTE
Complains of a cold for about 5 days.  Sinus congestion drainage and coughing.  Denies any fevers, shortness of breath or chest pain.  Is feeling better.  Taking no current medications.

## 2020-02-10 NOTE — PROGRESS NOTES
Subjective:   Genaro Kunz is a 44 y.o. male here today for insomnia, cold symptoms, hypertension, hyperlipidemia and anxiety.    Primary insomnia  This is a 44-year-old male here today to discuss few items.  Chronic history of insomnia.  Takes Ambien 5 mg nightly.  Requesting a refill today.  Now is starting to try to take the medication on workdays.  Takes Benadryl 50 mg which is a little effective.  Has no side effects.  In the past took trazodone but that caused him side effects.    Acute URI  Complains of a cold for about 5 days.  Sinus congestion drainage and coughing.  Denies any fevers, shortness of breath or chest pain.  Is feeling better.  Taking no current medications.    Essential hypertension  Blood pressure has been high but today it is not as high as it usually is but still hypertensive.  He states typically he can make the excuse a just got off of work.  He usually works night shifts.  Last night he did not going to work.    Hyperlipidemia LDL goal <100  Last cholesterol showed his LDL in the 140s.  Prior to that in the 100s.  Family history of heart disease.    Anxiety  Chronic, intermittent condition.  Takes Xanax as needed.  Does not need a refill today.  Has plenty of tablets left.  Is doing with his new position at [employer].       Current medicines (including changes today)  Current Outpatient Medications   Medication Sig Dispense Refill   • zolpidem (AMBIEN) 5 MG Tab Take 1 Tab by mouth at bedtime as needed for Sleep for up to 30 days. 30 Tab 2   • ALPRAZolam (XANAX) 0.25 MG Tab TAKE 1 TABLET BY MOUTH EVERY DAY AS NEEDED FOR ANXIETY FOR UP TO 30 DAYS 30 Tab 1   • cetirizine (ZYRTEC) 10 MG Tab TAKE 1 TABLET BY MOUTH EVERY DAY (NOT COVERED BY INS)  0   • fluticasone (FLONASE) 50 MCG/ACT nasal spray SPRAY 2 SPRAYS INTO EACH NOSTRIL EVERY DAY  0   • naproxen (NAPROSYN) 500 MG Tab TAKE ONE TABLET TWICE DAILY WITH FOOD AS NEEDED FOR FEVER/BODY ACHES  0   • acetaminophen (TYLENOL) 500 MG Tab  "Take 500 mg by mouth every 6 hours as needed.     • Multiple Vitamin (MULTI-VITAMIN PO) Take  by mouth every day.     • Melatonin-Pyridoxine (MELATIN PO) Take  by mouth every day.       No current facility-administered medications for this visit.      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    Social History and Family History were reviewed and updated.    ROS   No chest pain, no shortness of breath, no abdominal pain and all other systems were reviewed and are negative.       Objective:     /78 (BP Location: Right arm, Patient Position: Sitting, BP Cuff Size: Adult)   Pulse 90   Temp 36.6 °C (97.9 °F) (Temporal)   Resp 16   Ht 1.778 m (5' 10\")   Wt 93.4 kg (206 lb)   SpO2 94%  Body mass index is 29.56 kg/m².   Physical Exam:  Constitutional: Alert, no distress.  Skin: Warm, dry, good turgor, no rashes in visible areas.  Eye: Equal, round and reactive, conjunctiva clear, lids normal.  ENMT: Lips without lesions, good dentition, oropharynx clear.  Neck: Trachea midline, no masses.   Lymph: No cervical or supraclavicular lymphadenopathy  Respiratory: Unlabored respiratory effort, lungs clear to auscultation, no wheezes, no ronchi.  Cardiovascular: Normal S1, S2, no murmur, no edema.  Abdomen: Soft, non-tender, no masses.  Psych: Alert and oriented x3, normal affect and mood.        Assessment and Plan:   The following treatment plan was discussed    1. Primary insomnia  Chronic condition.  Stable.   reviewed.  Medication appropriate.  Renewed Ambien 5 mg nightly.  Provided a 90-day supply.  - zolpidem (AMBIEN) 5 MG Tab; Take 1 Tab by mouth at bedtime as needed for Sleep for up to 30 days.  Dispense: 30 Tab; Refill: 2    2. Anxiety  Chronic condition.  Stable.  No refill provided today for Xanax.  Last prescription was provided few months ago.  Stable condition.    3. Essential hypertension  New " condition noted in chart.  Likely chronic.  Advised during next appointment if not improving his blood pressure will start medication.    4. Hyperlipidemia LDL goal <100  Chronic condition.  During next office visit he will need cholesterol profile drawn.    5. Acute URI  Acute, new onset condition.  Discussed likely viral process.  Symptoms are improving.  We will continue to monitor.  Contact me or follow-up with any worsening symptoms such as fever, shortness of breath or chest pain.      Followup: Return in about 3 months (around 5/10/2020), or if symptoms worsen or fail to improve.    Please note that this dictation was created using voice recognition software. I have made every reasonable attempt to correct obvious errors, but I expect that there are errors of grammar and possibly content that I did not discover before finalizing the note.

## 2020-05-19 ENCOUNTER — OFFICE VISIT (OUTPATIENT)
Dept: MEDICAL GROUP | Facility: MEDICAL CENTER | Age: 45
End: 2020-05-19
Payer: COMMERCIAL

## 2020-05-19 VITALS
DIASTOLIC BLOOD PRESSURE: 88 MMHG | WEIGHT: 209 LBS | OXYGEN SATURATION: 94 % | SYSTOLIC BLOOD PRESSURE: 140 MMHG | RESPIRATION RATE: 16 BRPM | TEMPERATURE: 97.6 F | HEIGHT: 70 IN | BODY MASS INDEX: 29.92 KG/M2 | HEART RATE: 85 BPM

## 2020-05-19 DIAGNOSIS — N52.9 ERECTILE DYSFUNCTION, UNSPECIFIED ERECTILE DYSFUNCTION TYPE: ICD-10-CM

## 2020-05-19 DIAGNOSIS — I10 ESSENTIAL HYPERTENSION: ICD-10-CM

## 2020-05-19 DIAGNOSIS — F51.01 PRIMARY INSOMNIA: ICD-10-CM

## 2020-05-19 PROBLEM — J06.9 ACUTE URI: Status: RESOLVED | Noted: 2020-02-10 | Resolved: 2020-05-19

## 2020-05-19 PROCEDURE — 99214 OFFICE O/P EST MOD 30 MIN: CPT | Performed by: PHYSICIAN ASSISTANT

## 2020-05-19 RX ORDER — AMLODIPINE BESYLATE 5 MG/1
5 TABLET ORAL DAILY
Qty: 30 TAB | Refills: 2 | Status: SHIPPED | OUTPATIENT
Start: 2020-05-19 | End: 2020-06-17

## 2020-05-19 RX ORDER — ZOLPIDEM TARTRATE 5 MG/1
TABLET ORAL
COMMUNITY
Start: 2020-04-20 | End: 2020-05-19

## 2020-05-19 RX ORDER — ZOLPIDEM TARTRATE 5 MG/1
5 TABLET ORAL NIGHTLY PRN
Qty: 30 TAB | Refills: 2 | Status: SHIPPED | OUTPATIENT
Start: 2020-05-19 | End: 2020-08-17 | Stop reason: SDUPTHER

## 2020-05-19 RX ORDER — TADALAFIL 10 MG/1
10 TABLET ORAL PRN
Qty: 30 TAB | Refills: 3 | Status: SHIPPED | OUTPATIENT
Start: 2020-05-19 | End: 2020-05-19 | Stop reason: SDUPTHER

## 2020-05-19 RX ORDER — TADALAFIL 10 MG/1
10 TABLET ORAL PRN
Qty: 30 TAB | Refills: 3 | Status: SHIPPED | OUTPATIENT
Start: 2020-05-19 | End: 2021-12-13 | Stop reason: SDUPTHER

## 2020-05-19 ASSESSMENT — FIBROSIS 4 INDEX: FIB4 SCORE: 1.02

## 2020-05-19 NOTE — ASSESSMENT & PLAN NOTE
Chronic condition.  Requesting refill today of Ambien.  Takes 5 mg nightly.  Medication has been effective for many years.

## 2020-05-19 NOTE — ASSESSMENT & PLAN NOTE
This is a 45-year-old male here today to follow-up on his health.  His blood pressure remains elevated.  Today at 140/88.  Over the past 2 months he has gained weight.  Has not been as healthy as he could be.  The past we talked about starting medication.  He would like to begin medication today.

## 2020-05-19 NOTE — ASSESSMENT & PLAN NOTE
Chronic history of ED.  The past purchased Cialis 10 mg generic from [facility].  Medication was very effective.  Has run out of the medication would like to have a prescription.  Nuys any side effects while taking Cialis.

## 2020-05-19 NOTE — PROGRESS NOTES
Subjective:   Genaro Kunz is a 45 y.o. male here today for hypertension, insomnia and ED.    Essential hypertension  This is a 45-year-old male here today to follow-up on his health.  His blood pressure remains elevated.  Today at 140/88.  Over the past 2 months he has gained weight.  Has not been as healthy as he could be.  The past we talked about starting medication.  He would like to begin medication today.    Primary insomnia  Chronic condition.  Requesting refill today of Ambien.  Takes 5 mg nightly.  Medication has been effective for many years.    ED (erectile dysfunction)  Chronic history of ED.  The past purchased Cialis 10 mg generic from [facility].  Medication was very effective.  Has run out of the medication would like to have a prescription.  Nuys any side effects while taking Cialis.       Current medicines (including changes today)  Current Outpatient Medications   Medication Sig Dispense Refill   • zolpidem (AMBIEN) 5 MG Tab Take 1 Tab by mouth at bedtime as needed for Sleep for up to 30 days. 30 Tab 2   • amLODIPine (NORVASC) 5 MG Tab Take 1 Tab by mouth every day. 30 Tab 2   • tadalafil (CIALIS) 10 MG tablet Take 1 Tab by mouth as needed for Erectile Dysfunction. 30 Tab 3   • ALPRAZolam (XANAX) 0.25 MG Tab Take 1 Tab by mouth at bedtime as needed for Sleep for up to 30 days. 30 Tab 2   • Multiple Vitamin (MULTI-VITAMIN PO) Take  by mouth every day.     • Melatonin-Pyridoxine (MELATIN PO) Take  by mouth every day.       No current facility-administered medications for this visit.      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    Social History and Family History were reviewed and updated.    ROS   No chest pain, no shortness of breath, no abdominal pain and all other systems were reviewed and are negative.       Objective:     /88 (BP Location: Left arm, Patient Position: Sitting,  "BP Cuff Size: Adult)   Pulse 85   Temp 36.4 °C (97.6 °F)   Resp 16   Ht 1.778 m (5' 10\")   Wt 94.8 kg (209 lb)   SpO2 94%  Body mass index is 29.99 kg/m².   Physical Exam:  Constitutional: Alert, no distress.  Skin: Warm, dry, good turgor, no rashes in visible areas.  Eye: Equal, round and reactive, conjunctiva clear, lids normal.  ENMT: Lips without lesions, good dentition, oropharynx clear.  Neck: Trachea midline, no masses.   Lymph: No cervical or supraclavicular lymphadenopathy  Respiratory: Unlabored respiratory effort, lungs appear clear, no wheezes.  Cardiovascular: Regular rate and rhythm.  Psych: Alert and oriented x3, normal affect and mood.        Assessment and Plan:   The following treatment plan was discussed    1. Essential hypertension  Chronic condition.  Discussed lifestyle modifications.  Will start amlodipine 5 mg daily.  Discussed side effects.  Follow-up in 3 months.  - amLODIPine (NORVASC) 5 MG Tab; Take 1 Tab by mouth every day.  Dispense: 30 Tab; Refill: 2    2. Primary insomnia  Chronic condition.  Stable.   reviewed.  Provided Ambien as directed.  Sent over prescriptions to his pharmacy electronically.  Appointment made for 3 months.  - zolpidem (AMBIEN) 5 MG Tab; Take 1 Tab by mouth at bedtime as needed for Sleep for up to 30 days.  Dispense: 30 Tab; Refill: 2    3. Erectile dysfunction, unspecified erectile dysfunction type  New condition noted in chart but chronic.  Provided prescription for tadalafil 10 mg.  Sent prescription to [facility].  Printed off coupon through [facility].  He is aware of side effects.  - tadalafil (CIALIS) 10 MG tablet; Take 1 Tab by mouth as needed for Erectile Dysfunction.  Dispense: 30 Tab; Refill: 3      Followup: Return in about 3 months (around 8/19/2020), or if symptoms worsen or fail to improve.    Please note that this dictation was created using voice recognition software. I have made every reasonable attempt to correct obvious errors, but I expect " that there are errors of grammar and possibly content that I did not discover before finalizing the note.

## 2020-06-17 DIAGNOSIS — I10 ESSENTIAL HYPERTENSION: ICD-10-CM

## 2020-06-17 RX ORDER — AMLODIPINE BESYLATE 5 MG/1
TABLET ORAL
Qty: 30 TAB | Refills: 5 | Status: SHIPPED | OUTPATIENT
Start: 2020-06-17 | End: 2020-10-15

## 2020-06-17 NOTE — TELEPHONE ENCOUNTER
Received request via: Pharmacy    Was the patient seen in the last year in this department? Yes    Does the patient have an active prescription (recently filled or refills available) for medication(s) requested? No     Requested Prescriptions     Pending Prescriptions Disp Refills   • amLODIPine (NORVASC) 5 MG Tab [Pharmacy Med Name: AMLODIPINE BESYLATE 5 MG TAB] 30 Tab 2     Sig: TAKE 1 TABLET BY MOUTH EVERY DAY

## 2020-08-17 ENCOUNTER — OFFICE VISIT (OUTPATIENT)
Dept: MEDICAL GROUP | Facility: MEDICAL CENTER | Age: 45
End: 2020-08-17
Payer: COMMERCIAL

## 2020-08-17 VITALS
TEMPERATURE: 98.4 F | DIASTOLIC BLOOD PRESSURE: 78 MMHG | HEART RATE: 92 BPM | HEIGHT: 70 IN | RESPIRATION RATE: 16 BRPM | SYSTOLIC BLOOD PRESSURE: 132 MMHG | BODY MASS INDEX: 29.92 KG/M2 | OXYGEN SATURATION: 96 % | WEIGHT: 209 LBS

## 2020-08-17 DIAGNOSIS — Z00.00 PREVENTATIVE HEALTH CARE: ICD-10-CM

## 2020-08-17 DIAGNOSIS — F51.01 PRIMARY INSOMNIA: ICD-10-CM

## 2020-08-17 DIAGNOSIS — R79.89 LOW TESTOSTERONE: ICD-10-CM

## 2020-08-17 PROCEDURE — 99214 OFFICE O/P EST MOD 30 MIN: CPT | Performed by: PHYSICIAN ASSISTANT

## 2020-08-17 RX ORDER — ALPRAZOLAM 0.25 MG/1
TABLET ORAL
COMMUNITY
Start: 2020-07-22 | End: 2020-11-16

## 2020-08-17 RX ORDER — ZOLPIDEM TARTRATE 5 MG/1
5 TABLET ORAL NIGHTLY PRN
Qty: 30 TAB | Refills: 2 | Status: SHIPPED | OUTPATIENT
Start: 2020-08-17 | End: 2020-08-18

## 2020-08-17 RX ORDER — ZOLPIDEM TARTRATE 5 MG/1
TABLET ORAL
COMMUNITY
Start: 2020-07-22 | End: 2020-08-17

## 2020-08-17 ASSESSMENT — FIBROSIS 4 INDEX: FIB4 SCORE: 1.02

## 2020-08-17 NOTE — ASSESSMENT & PLAN NOTE
I have checked testosterone levels twice.  First time was below 250.  Second time was at 363.  Still dealing with ED and fatigue.  Does work at nights.  Does not exercise routinely.  Does walk a lot though when he is working.

## 2020-08-17 NOTE — ASSESSMENT & PLAN NOTE
This is a 45-year-old male here today to discuss his insomnia.  Chronic condition.  Takes Ambien 5 mg nightly.  Medication has been effective.  Has been taking medication for several years.

## 2020-08-17 NOTE — PROGRESS NOTES
"Subjective:   Genaro Kunz is a 45 y.o. male here today for insomnia and low testosterone.  Plus preventative health care.    Primary insomnia  This is a 45-year-old male here today to discuss his insomnia.  Chronic condition.  Takes Ambien 5 mg nightly.  Medication has been effective.  Has been taking medication for several years.    Low testosterone  I have checked testosterone levels twice.  First time was below 250.  Second time was at 363.  Still dealing with ED and fatigue.  Does work at nights.  Does not exercise routinely.  Does walk a lot though when he is working.       Current medicines (including changes today)  Current Outpatient Medications   Medication Sig Dispense Refill   • ALPRAZolam (XANAX) 0.25 MG Tab TAKE 1 TABLET BY MOUTH AT BEDTIME FOR SLEEP FOR 30 DAY. F41.9     • zolpidem (AMBIEN) 5 MG Tab Take 1 Tab by mouth at bedtime as needed for Sleep for up to 30 days. 30 Tab 2   • amLODIPine (NORVASC) 5 MG Tab TAKE 1 TABLET BY MOUTH EVERY DAY 30 Tab 5   • tadalafil (CIALIS) 10 MG tablet Take 1 Tab by mouth as needed for Erectile Dysfunction. 30 Tab 3     No current facility-administered medications for this visit.      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    Social History and Family History were reviewed and updated.    ROS   No chest pain, no shortness of breath, no abdominal pain and all other systems were reviewed and are negative.       Objective:     /78   Pulse 92   Temp 36.9 °C (98.4 °F) (Temporal)   Resp 16   Ht 1.778 m (5' 10\")   Wt 94.8 kg (209 lb)   SpO2 96%  Body mass index is 29.99 kg/m².   Physical Exam:  Constitutional: Alert, no distress.  Skin: Warm, dry, good turgor, no rashes in visible areas.  Eye: Equal, round and reactive, conjunctiva clear, lids normal.  ENMT: Lips without lesions, good dentition, oropharynx clear.  Neck: Trachea midline, no " masses.   Lymph: No cervical or supraclavicular lymphadenopathy  Respiratory: Unlabored respiratory effort, lungs appear clear, no wheezes.  Cardiovascular: Regular rate and rhythm.  Psych: Alert and oriented x3, normal affect and mood.        Assessment and Plan:   The following treatment plan was discussed    1. Primary insomnia  Chronic condition.   reviewed.  Medication appropriate.  Stable condition.  Renewed Ambien and sent over 3 prescriptions electronically totaling 90 days.  Appointment made in 3 months.  - zolpidem (AMBIEN) 5 MG Tab; Take 1 Tab by mouth at bedtime as needed for Sleep for up to 30 days.  Dispense: 30 Tab; Refill: 2    2. Low testosterone  Chronic condition.  Will check testosterone levels one more time.  If low will refer to endocrinology.    - TESTOSTERONE F&T MALE ADULT; Future    3. Preventative health care  Order labs fasting.  He will be contacted with the results.  - Lipid Profile; Future  - Comp Metabolic Panel; Future  - HEMOGLOBIN A1C; Future    In passing he mentioned that his goal is to get down to 200 pounds.  He currently is around 209.    Followup: Return in about 3 months (around 11/17/2020), or if symptoms worsen or fail to improve.    Please note that this dictation was created using voice recognition software. I have made every reasonable attempt to correct obvious errors, but I expect that there are errors of grammar and possibly content that I did not discover before finalizing the note.

## 2020-10-15 DIAGNOSIS — I10 ESSENTIAL HYPERTENSION: ICD-10-CM

## 2020-10-15 RX ORDER — AMLODIPINE BESYLATE 5 MG/1
TABLET ORAL
Qty: 90 TAB | Refills: 1 | Status: SHIPPED | OUTPATIENT
Start: 2020-10-15 | End: 2021-01-15

## 2020-10-15 NOTE — TELEPHONE ENCOUNTER
Received request via: Pharmacy    Was the patient seen in the last year in this department? Yes    Does the patient have an active prescription (recently filled or refills available) for medication(s) requested? No     Requested Prescriptions     Pending Prescriptions Disp Refills   • amLODIPine (NORVASC) 5 MG Tab [Pharmacy Med Name: AMLODIPINE BESYLATE 5 MG TAB] 90 Tab 1     Sig: TAKE 1 TABLET BY MOUTH EVERY DAY

## 2020-11-02 ENCOUNTER — HOSPITAL ENCOUNTER (OUTPATIENT)
Dept: LAB | Facility: MEDICAL CENTER | Age: 45
End: 2020-11-02
Attending: PHYSICIAN ASSISTANT
Payer: COMMERCIAL

## 2020-11-02 DIAGNOSIS — Z00.00 PREVENTATIVE HEALTH CARE: ICD-10-CM

## 2020-11-02 DIAGNOSIS — R79.89 LOW TESTOSTERONE: ICD-10-CM

## 2020-11-02 LAB
ALBUMIN SERPL BCP-MCNC: 4.5 G/DL (ref 3.2–4.9)
ALBUMIN/GLOB SERPL: 1.7 G/DL
ALP SERPL-CCNC: 70 U/L (ref 30–99)
ALT SERPL-CCNC: 20 U/L (ref 2–50)
ANION GAP SERPL CALC-SCNC: 5 MMOL/L (ref 7–16)
AST SERPL-CCNC: 14 U/L (ref 12–45)
BILIRUB SERPL-MCNC: 0.5 MG/DL (ref 0.1–1.5)
BUN SERPL-MCNC: 14 MG/DL (ref 8–22)
CALCIUM SERPL-MCNC: 9.6 MG/DL (ref 8.5–10.5)
CHLORIDE SERPL-SCNC: 100 MMOL/L (ref 96–112)
CHOLEST SERPL-MCNC: 241 MG/DL (ref 100–199)
CO2 SERPL-SCNC: 29 MMOL/L (ref 20–33)
CREAT SERPL-MCNC: 0.87 MG/DL (ref 0.5–1.4)
EST. AVERAGE GLUCOSE BLD GHB EST-MCNC: 114 MG/DL
FASTING STATUS PATIENT QL REPORTED: NORMAL
GLOBULIN SER CALC-MCNC: 2.7 G/DL (ref 1.9–3.5)
GLUCOSE SERPL-MCNC: 110 MG/DL (ref 65–99)
HBA1C MFR BLD: 5.6 % (ref 0–5.6)
HDLC SERPL-MCNC: 57 MG/DL
LDLC SERPL CALC-MCNC: 169 MG/DL
POTASSIUM SERPL-SCNC: 4.3 MMOL/L (ref 3.6–5.5)
PROT SERPL-MCNC: 7.2 G/DL (ref 6–8.2)
SODIUM SERPL-SCNC: 134 MMOL/L (ref 135–145)
TRIGL SERPL-MCNC: 74 MG/DL (ref 0–149)

## 2020-11-02 PROCEDURE — 83036 HEMOGLOBIN GLYCOSYLATED A1C: CPT

## 2020-11-02 PROCEDURE — 84270 ASSAY OF SEX HORMONE GLOBUL: CPT

## 2020-11-02 PROCEDURE — 36415 COLL VENOUS BLD VENIPUNCTURE: CPT

## 2020-11-02 PROCEDURE — 80053 COMPREHEN METABOLIC PANEL: CPT

## 2020-11-02 PROCEDURE — 84402 ASSAY OF FREE TESTOSTERONE: CPT

## 2020-11-02 PROCEDURE — 84403 ASSAY OF TOTAL TESTOSTERONE: CPT

## 2020-11-02 PROCEDURE — 80061 LIPID PANEL: CPT

## 2020-11-04 LAB
SHBG SERPL-SCNC: 19 NMOL/L (ref 11–80)
TESTOST FREE MFR SERPL: 2.3 % (ref 1.6–2.9)
TESTOST FREE SERPL-MCNC: 52 PG/ML (ref 47–244)
TESTOST SERPL-MCNC: 229 NG/DL (ref 300–890)

## 2020-11-16 ENCOUNTER — OFFICE VISIT (OUTPATIENT)
Dept: MEDICAL GROUP | Facility: MEDICAL CENTER | Age: 45
End: 2020-11-16
Payer: COMMERCIAL

## 2020-11-16 VITALS
RESPIRATION RATE: 16 BRPM | WEIGHT: 206 LBS | TEMPERATURE: 97.9 F | OXYGEN SATURATION: 96 % | BODY MASS INDEX: 29.49 KG/M2 | SYSTOLIC BLOOD PRESSURE: 136 MMHG | HEIGHT: 70 IN | HEART RATE: 86 BPM | DIASTOLIC BLOOD PRESSURE: 72 MMHG

## 2020-11-16 DIAGNOSIS — F41.9 ANXIETY: ICD-10-CM

## 2020-11-16 DIAGNOSIS — E78.5 HYPERLIPIDEMIA LDL GOAL <100: ICD-10-CM

## 2020-11-16 DIAGNOSIS — F51.01 PRIMARY INSOMNIA: ICD-10-CM

## 2020-11-16 DIAGNOSIS — E29.1 HYPOGONADISM IN MALE: ICD-10-CM

## 2020-11-16 DIAGNOSIS — Z91.89 OTHER SPECIFIED PERSONAL RISK FACTORS, NOT ELSEWHERE CLASSIFIED: ICD-10-CM

## 2020-11-16 PROCEDURE — 99214 OFFICE O/P EST MOD 30 MIN: CPT | Performed by: PHYSICIAN ASSISTANT

## 2020-11-16 RX ORDER — ALPRAZOLAM 0.25 MG/1
0.25 TABLET ORAL NIGHTLY PRN
Qty: 30 TAB | Refills: 2 | Status: SHIPPED | OUTPATIENT
Start: 2020-11-16 | End: 2021-02-19 | Stop reason: SDUPTHER

## 2020-11-16 RX ORDER — ZOLPIDEM TARTRATE 5 MG/1
TABLET ORAL
Qty: 30 TAB | Refills: 2 | Status: SHIPPED | OUTPATIENT
Start: 2020-11-16 | End: 2021-02-19 | Stop reason: SDUPTHER

## 2020-11-16 ASSESSMENT — FIBROSIS 4 INDEX: FIB4 SCORE: 0.64

## 2020-11-16 NOTE — ASSESSMENT & PLAN NOTE
Also chronic history of intermittent, anxiety.  Takes Xanax 0.25 mg as needed.  Requesting a refill.  Medications effective.

## 2020-11-16 NOTE — ASSESSMENT & PLAN NOTE
2 out of 3 of his testosterone test have been low range.  He lacks motivation.  Does not have any ambition or drive any longer.  Is interested in testosterone replacement therapy.

## 2020-11-16 NOTE — PROGRESS NOTES
Subjective:   Genaro Kunz is a 45 y.o. male here today for insomnia, anxiety, hyperlipidemia and hypergonadism.    Primary insomnia  This is a 45-year-old male here today to get refills of medication also did discuss his recent labs.  Requesting refill of Ambien.  Takes 5 mg at bedtime.  Is been taking medication for several years.  Medications effective.    Anxiety  Also chronic history of intermittent, anxiety.  Takes Xanax 0.25 mg as needed.  Requesting a refill.  Medications effective.    Hyperlipidemia LDL goal <100  He has been working on his diet.  Gave up sodas as well as some carbs with breads.  Unfortunate has been eating more fatty foods and his cholesterol was elevated.  Total cholesterol well above 200 and LDL above 160.  Family history of heart disease.    Hypogonadism in male  2 out of 3 of his testosterone test have been low range.  He lacks motivation.  Does not have any ambition or drive any longer.  Is interested in testosterone replacement therapy.       Current medicines (including changes today)  Current Outpatient Medications   Medication Sig Dispense Refill   • zolpidem (AMBIEN) 5 MG Tab TAKE 1 TABLET BY MOUTH AT BEDTIME AS NEEDED FOR SLEEP FOR 30 DAYS. F51.01 30 Tab 2   • ALPRAZolam (XANAX) 0.25 MG Tab Take 1 Tab by mouth at bedtime as needed for Sleep for up to 30 days. 30 Tab 2   • amLODIPine (NORVASC) 5 MG Tab TAKE 1 TABLET BY MOUTH EVERY DAY 90 Tab 1   • tadalafil (CIALIS) 10 MG tablet Take 1 Tab by mouth as needed for Erectile Dysfunction. 30 Tab 3     No current facility-administered medications for this visit.      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    Social History and Family History were reviewed and updated.    ROS   No chest pain, no shortness of breath, no abdominal pain and all other systems were reviewed and are negative.       Objective:     /72    "Pulse 86   Temp 36.6 °C (97.9 °F) (Temporal)   Resp 16   Ht 1.778 m (5' 10\")   Wt 93.4 kg (206 lb)   SpO2 96%  Body mass index is 29.56 kg/m².   Physical Exam:  Constitutional: Alert, no distress.  Skin: Warm, dry, good turgor, no rashes in visible areas.  Eye: Equal, round and reactive, conjunctiva clear, lids normal.  ENMT: Lips without lesions, good dentition, oropharynx clear.  Neck: Trachea midline, no masses.   Lymph: No cervical or supraclavicular lymphadenopathy  Respiratory: Unlabored respiratory effort, lungs appear clear, no wheezes.  Cardiovascular: Regular rate rhythm.  Psych: Alert and oriented x3, normal affect and mood.        Assessment and Plan:   The following treatment plan was discussed    1. Primary insomnia  Chronic condition.  Stable.   reviewed.  Renewed Ambien 5 mg.  Sent a 90-day supply to his pharmacy.  Appointment made 3 months.  - zolpidem (AMBIEN) 5 MG Tab; TAKE 1 TABLET BY MOUTH AT BEDTIME AS NEEDED FOR SLEEP FOR 30 DAYS. F51.01  Dispense: 30 Tab; Refill: 2    2. Anxiety  Chronic condition.  Stable.  Renewed Xanax 0.25 mg as needed.  Provided a 90-day supply.  - ALPRAZolam (XANAX) 0.25 MG Tab; Take 1 Tab by mouth at bedtime as needed for Sleep for up to 30 days.  Dispense: 30 Tab; Refill: 2    3. Hyperlipidemia LDL goal <100  Chronic condition.  Concerned about elevated cholesterol.  Discussed medications which he is willing to start.  We will first order CT cardiac score to see risk.  - CT-CARDIAC SCORING; Future    4. Hypogonadism in male  Chronic condition.  2 out of 3 testosterone values are 250 or lower.  Refer to endocrinology.  Advised him of the difficulty of seeing endocrinology.  If needed I may start replacement therapy.  - REFERRAL TO ENDOCRINOLOGY      Followup: Return in about 3 months (around 2/16/2021), or if symptoms worsen or fail to improve.    Please note that this dictation was created using voice recognition software. I have made every reasonable attempt " to correct obvious errors, but I expect that there are errors of grammar and possibly content that I did not discover before finalizing the note.

## 2020-11-16 NOTE — ASSESSMENT & PLAN NOTE
He has been working on his diet.  Gave up sodas as well as some carbs with breads.  Unfortunate has been eating more fatty foods and his cholesterol was elevated.  Total cholesterol well above 200 and LDL above 160.  Family history of heart disease.

## 2020-11-16 NOTE — ASSESSMENT & PLAN NOTE
This is a 45-year-old male here today to get refills of medication also did discuss his recent labs.  Requesting refill of Ambien.  Takes 5 mg at bedtime.  Is been taking medication for several years.  Medications effective.

## 2021-01-06 ENCOUNTER — HOSPITAL ENCOUNTER (OUTPATIENT)
Dept: LAB | Facility: MEDICAL CENTER | Age: 46
End: 2021-01-06
Attending: FAMILY MEDICINE
Payer: COMMERCIAL

## 2021-01-06 ENCOUNTER — TELEMEDICINE (OUTPATIENT)
Dept: TELEHEALTH | Facility: TELEMEDICINE | Age: 46
End: 2021-01-06
Payer: COMMERCIAL

## 2021-01-06 DIAGNOSIS — R05.9 COUGH: ICD-10-CM

## 2021-01-06 DIAGNOSIS — Z20.822 CLOSE EXPOSURE TO COVID-19 VIRUS: ICD-10-CM

## 2021-01-06 LAB — COVID ORDER STATUS COVID19: NORMAL

## 2021-01-06 PROCEDURE — U0005 INFEC AGEN DETEC AMPLI PROBE: HCPCS

## 2021-01-06 PROCEDURE — C9803 HOPD COVID-19 SPEC COLLECT: HCPCS

## 2021-01-06 PROCEDURE — U0003 INFECTIOUS AGENT DETECTION BY NUCLEIC ACID (DNA OR RNA); SEVERE ACUTE RESPIRATORY SYNDROME CORONAVIRUS 2 (SARS-COV-2) (CORONAVIRUS DISEASE [COVID-19]), AMPLIFIED PROBE TECHNIQUE, MAKING USE OF HIGH THROUGHPUT TECHNOLOGIES AS DESCRIBED BY CMS-2020-01-R: HCPCS

## 2021-01-06 PROCEDURE — 99213 OFFICE O/P EST LOW 20 MIN: CPT | Mod: 95,CR,CS | Performed by: FAMILY MEDICINE

## 2021-01-06 NOTE — PROGRESS NOTES
Telemedicine: Established Patient   This evaluation was conducted via Zoom using secure and encrypted videoconferencing technology. The patient was in a private location in the state of [place].    The patient's identity was confirmed and verbal consent was obtained for this virtual visit.    Subjective:   CC:  Cough, COVID exposure      Genaro Kunz is a 45 y.o. male presenting for evaluation and management of:    HPI:      Pt exposed to COVID several days ago.           He c/o body aches, anosmia.   Denies f/c.  Appetite is normal.   No n/v.    + dry cough x 3 d, but denies sore throat.         ROS    Review of Systems   Constitutional: Negative for fever, chills and malaise/fatigue.   Eyes: Negative for vision changes, d/c.    Respiratory: + for cough .  Denies sputum production.    Cardiovascular: Negative for chest pain and palpitations.   Gastrointestinal: Negative for nausea, vomiting, abdominal pain, diarrhea and constipation.   Genitourinary: Negative for dysuria, urgency and frequency.   Skin: Negative for rash or  itching.   Neurological: Negative for dizziness and tingling.    Psychiatric/Behavioral: Negative for depression.   Hematologic/lymphatic - denies bruising or excessive bleeding  All other systems reviewed and are negative.    No Known Allergies    Current medicines (including changes today)  Current Outpatient Medications   Medication Sig Dispense Refill   • amLODIPine (NORVASC) 5 MG Tab TAKE 1 TABLET BY MOUTH EVERY DAY 90 Tab 1   • tadalafil (CIALIS) 10 MG tablet Take 1 Tab by mouth as needed for Erectile Dysfunction. 30 Tab 3     No current facility-administered medications for this visit.        Patient Active Problem List    Diagnosis Date Noted   • ED (erectile dysfunction) 05/19/2020   • Essential hypertension 02/10/2020   • Hyperlipidemia LDL goal <100 06/03/2019   • Hypogonadism in male 01/29/2019   • Primary insomnia 01/29/2019   • Neoplasm of uncertain behavior of skin of neck  02/09/2018   • Anxiety 02/06/2018   • Chronic bilateral low back pain without sciatica 02/06/2018   • Chronic neck pain 02/06/2018   • GERD (gastroesophageal reflux disease) 02/06/2018       Family History   Problem Relation Age of Onset   • Hyperlipidemia Father    • Hypertension Father    • Heart Disease Father        He  has a past medical history of ASTHMA, Heart burn, Insomnia, 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  He  has a past surgical history that includes mandible reduction closed; pr shldr arthroscop,part acromioplas (7/31/2019); shoulder arthroscopy (Right, 7/31/2019); clavicle distal excision (7/31/2019); rotator cuff repair (7/31/2019); open biceps tenodesis (7/31/2019); and tenotomy (7/31/2019).       Objective:   There were no vitals taken for this visit.    Physical Exam  GEN:  NAD  HEENT -   EOMI  Neuro - alert and oriented x3.    Lungs - clear.   No distress  Skin:  No rashes  Psych:   Appropriate mood and affect       Assessment and Plan:       1. Close exposure to COVID-19 virus   2. Cough     COVID screen ordered  Home isolation for now  Will call with results.     Follow up in one week if no improvement, sooner if symptoms worsen.           - COVID/SARS COV-2 PCR; Future

## 2021-01-07 LAB
SARS-COV-2 RNA RESP QL NAA+PROBE: NOTDETECTED
SPECIMEN SOURCE: NORMAL

## 2021-01-15 DIAGNOSIS — I10 ESSENTIAL HYPERTENSION: ICD-10-CM

## 2021-01-15 RX ORDER — AMLODIPINE BESYLATE 5 MG/1
TABLET ORAL
Qty: 90 TAB | Refills: 1 | Status: SHIPPED | OUTPATIENT
Start: 2021-01-15 | End: 2021-04-12 | Stop reason: SDUPTHER

## 2021-01-15 NOTE — TELEPHONE ENCOUNTER
Received request via: Pharmacy    Was the patient seen in the last year in this department? Yes    Patients next Appointment:  2/19/2021     Requested Prescriptions     Pending Prescriptions Disp Refills   • amLODIPine (NORVASC) 5 MG Tab [Pharmacy Med Name: AMLODIPINE BESYLATE 5 MG TAB] 90 Tab 1     Sig: TAKE 1 TABLET BY MOUTH EVERY DAY

## 2021-02-10 ENCOUNTER — HOSPITAL ENCOUNTER (OUTPATIENT)
Dept: RADIOLOGY | Facility: MEDICAL CENTER | Age: 46
End: 2021-02-10
Attending: PHYSICIAN ASSISTANT
Payer: COMMERCIAL

## 2021-02-10 DIAGNOSIS — E78.5 HYPERLIPIDEMIA LDL GOAL <100: ICD-10-CM

## 2021-02-10 PROBLEM — R93.1 AGATSTON CORONARY ARTERY CALCIUM SCORE LESS THAN 100: Status: ACTIVE | Noted: 2021-02-10

## 2021-02-10 PROCEDURE — 4410556 CT-CARDIAC SCORING (SELF PAY ONLY)

## 2021-02-11 ENCOUNTER — TELEPHONE (OUTPATIENT)
Dept: MEDICAL GROUP | Facility: MEDICAL CENTER | Age: 46
End: 2021-02-11

## 2021-02-11 NOTE — TELEPHONE ENCOUNTER
ESTABLISHED PATIENT PRE-VISIT PLANNING     Patient was NOT contacted to complete PVP.     Note: Patient will not be contacted if there is no indication to call.     1.  Reviewed notes from the last few office visits within the medical group: Yes    2.  If any orders were placed at last visit or intended to be done for this visit (i.e. 6 mos follow-up), do we have Results/Consult Notes?         •  Labs - Labs were not ordered at last office visit.  Note: If patient appointment is for lab review and patient did not complete labs, check with provider if OK to reschedule patient until labs completed.       •  Imaging - Imaging was not ordered at last office visit.       •  Referrals - Referral ordered, patient has NOT been seen.    3. Is this appointment scheduled as a Hospital Follow-Up? No    4.  Immunizations were updated in Epic using Reconcile Outside Information activity? Yes    5.  Patient is due for the following Health Maintenance Topics:   Health Maintenance Due   Topic Date Due   • IMM INFLUENZA (1) 09/01/2020     6.  AHA (Pulse8) form printed for Provider? N/A

## 2021-02-19 ENCOUNTER — OFFICE VISIT (OUTPATIENT)
Dept: MEDICAL GROUP | Facility: MEDICAL CENTER | Age: 46
End: 2021-02-19
Payer: COMMERCIAL

## 2021-02-19 VITALS
SYSTOLIC BLOOD PRESSURE: 126 MMHG | HEART RATE: 84 BPM | RESPIRATION RATE: 16 BRPM | BODY MASS INDEX: 29.92 KG/M2 | WEIGHT: 209 LBS | OXYGEN SATURATION: 95 % | HEIGHT: 70 IN | DIASTOLIC BLOOD PRESSURE: 82 MMHG

## 2021-02-19 DIAGNOSIS — E29.1 HYPOGONADISM IN MALE: ICD-10-CM

## 2021-02-19 DIAGNOSIS — F41.9 ANXIETY: ICD-10-CM

## 2021-02-19 DIAGNOSIS — F51.01 PRIMARY INSOMNIA: ICD-10-CM

## 2021-02-19 PROCEDURE — 99213 OFFICE O/P EST LOW 20 MIN: CPT | Performed by: PHYSICIAN ASSISTANT

## 2021-02-19 RX ORDER — ALPRAZOLAM 0.25 MG/1
0.25 TABLET ORAL NIGHTLY PRN
Qty: 30 TABLET | Refills: 2 | Status: SHIPPED | OUTPATIENT
Start: 2021-02-19 | End: 2021-04-12 | Stop reason: SDUPTHER

## 2021-02-19 RX ORDER — ALPRAZOLAM 0.25 MG/1
TABLET ORAL
COMMUNITY
Start: 2021-01-22 | End: 2021-02-19

## 2021-02-19 RX ORDER — ZOLPIDEM TARTRATE 5 MG/1
TABLET ORAL
Qty: 30 TABLET | Refills: 2 | Status: SHIPPED | OUTPATIENT
Start: 2021-02-19 | End: 2021-04-12 | Stop reason: SDUPTHER

## 2021-02-19 RX ORDER — ZOLPIDEM TARTRATE 5 MG/1
TABLET ORAL
COMMUNITY
Start: 2021-01-22 | End: 2021-02-19

## 2021-02-19 ASSESSMENT — PATIENT HEALTH QUESTIONNAIRE - PHQ9: CLINICAL INTERPRETATION OF PHQ2 SCORE: 0

## 2021-02-19 ASSESSMENT — FIBROSIS 4 INDEX: FIB4 SCORE: 0.64

## 2021-02-19 NOTE — PROGRESS NOTES
"Subjective:   Genaro Kunz is a 45 y.o. male here today for insomnia and anxiety.    Primary insomnia  This is a pleasant 45-year-old male here today to follow-up on his health.  Doing well.  No complaints.  Requesting refill today of Ambien.  Takes it nightly.  Medications effective.    Yesterday as well he started to wear new contacts.  He has not worn them in the past.  He is unsure where the right contact went.  His right eye is watering but had difficulty placing the contact in that eye yesterday.    Anxiety  Also complains of chronic intermittent anxiety.  Take Xanax as needed.  Requesting a refill.  No worsening anxiety.    Hypogonadism in male  Chronic condition.  He has not contacted endocrinology yet to make an appointment.  2 out of 3 testosterone tests were in low range.       Current medicines (including changes today)  Current Outpatient Medications   Medication Sig Dispense Refill   • zolpidem (AMBIEN) 5 MG Tab TAKE 1 TABLET BY MOUTH AT BEDTIME AS NEEDED FOR SLEEP FOR 30 DAYS. F51.01 30 tablet 2   • ALPRAZolam (XANAX) 0.25 MG Tab Take 1 tablet by mouth at bedtime as needed for Sleep for up to 30 days. 30 tablet 2   • amLODIPine (NORVASC) 5 MG Tab TAKE 1 TABLET BY MOUTH EVERY DAY 90 Tab 1   • tadalafil (CIALIS) 10 MG tablet Take 1 Tab by mouth as needed for Erectile Dysfunction. 30 Tab 3     No current facility-administered medications for this visit.     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    Social History and Family History were reviewed and updated.    ROS  No chest pain, no shortness of breath, no abdominal pain and all other systems were reviewed and are negative.       Objective:     /82   Pulse 84   Resp 16   Ht 1.778 m (5' 10\")   Wt 94.8 kg (209 lb)   SpO2 95%  Body mass index is 29.99 kg/m².   Physical Exam:  Constitutional: Alert, no distress.  Skin: Warm, dry, " good turgor, no rashes in visible areas.  Eye: Equal, round and reactive, conjunctiva clear, lids normal.  No contact noted in right eye.  ENMT: Lips without lesions, good dentition, oropharynx clear.  Neck: Trachea midline, no masses.   Lymph: No cervical or supraclavicular lymphadenopathy  Respiratory: Unlabored respiratory effort, lungs appear clear, no wheezes.  Cardiovascular: Regular rate rhythm.  Psych: Alert and oriented x3, normal affect and mood.        Assessment and Plan:   The following treatment plan was discussed    1. Primary insomnia  Chronic condition.  Stable.  Renewed Ambien as directed.   reviewed.  Electronically sent over 90-day supply.  - zolpidem (AMBIEN) 5 MG Tab; TAKE 1 TABLET BY MOUTH AT BEDTIME AS NEEDED FOR SLEEP FOR 30 DAYS. F51.01  Dispense: 30 tablet; Refill: 2    2. Anxiety  Chronic condition.  Stable.  Renewed Xanax as directed.  Provided a 90-day supply.  Appointment made in 3 months.  - ALPRAZolam (XANAX) 0.25 MG Tab; Take 1 tablet by mouth at bedtime as needed for Sleep for up to 30 days.  Dispense: 30 tablet; Refill: 2    3. Hypogonadism in male  Chronic condition.  Provided information to contact endocrinology regarding setting up an appointment for consultation.         Followup: Return in about 3 months (around 5/19/2021).    Please note that this dictation was created using voice recognition software. I have made every reasonable attempt to correct obvious errors, but I expect that there are errors of grammar and possibly content that I did not discover before finalizing the note.

## 2021-04-12 DIAGNOSIS — F51.01 PRIMARY INSOMNIA: ICD-10-CM

## 2021-04-12 DIAGNOSIS — F41.9 ANXIETY: ICD-10-CM

## 2021-04-12 DIAGNOSIS — I10 ESSENTIAL HYPERTENSION: ICD-10-CM

## 2021-04-12 RX ORDER — AMLODIPINE BESYLATE 5 MG/1
5 TABLET ORAL
Qty: 90 TABLET | Refills: 1 | Status: SHIPPED | OUTPATIENT
Start: 2021-04-12 | End: 2021-05-12

## 2021-04-12 RX ORDER — ALPRAZOLAM 0.25 MG/1
0.25 TABLET ORAL NIGHTLY PRN
Qty: 30 TABLET | Refills: 2 | Status: SHIPPED | OUTPATIENT
Start: 2021-04-12 | End: 2021-05-11 | Stop reason: SDUPTHER

## 2021-04-12 RX ORDER — ZOLPIDEM TARTRATE 5 MG/1
7.5 TABLET ORAL
Qty: 45 TABLET | Refills: 2 | Status: SHIPPED | OUTPATIENT
Start: 2021-04-12 | End: 2021-05-11 | Stop reason: SDUPTHER

## 2021-04-12 NOTE — TELEPHONE ENCOUNTER
Received request via: Patient    Was the patient seen in the last year in this department? Yes    Does the patient have an active prescription (recently filled or refills available) for medication(s) requested? No     Patient requesting his zolpidem be increased to 7.5mg-currently q'd up for the original 5 mg.

## 2021-04-21 ENCOUNTER — APPOINTMENT (OUTPATIENT)
Dept: MEDICAL GROUP | Facility: MEDICAL CENTER | Age: 46
End: 2021-04-21
Payer: COMMERCIAL

## 2021-04-21 DIAGNOSIS — M79.671 RIGHT FOOT PAIN: ICD-10-CM

## 2021-04-21 DIAGNOSIS — M79.672 LEFT FOOT PAIN: ICD-10-CM

## 2021-05-07 ENCOUNTER — APPOINTMENT (OUTPATIENT)
Dept: MEDICAL GROUP | Facility: MEDICAL CENTER | Age: 46
End: 2021-05-07
Payer: COMMERCIAL

## 2021-05-11 DIAGNOSIS — I10 ESSENTIAL HYPERTENSION: ICD-10-CM

## 2021-05-11 DIAGNOSIS — F51.01 PRIMARY INSOMNIA: ICD-10-CM

## 2021-05-11 DIAGNOSIS — F41.9 ANXIETY: ICD-10-CM

## 2021-05-11 RX ORDER — ZOLPIDEM TARTRATE 5 MG/1
7.5 TABLET ORAL
Qty: 45 TABLET | Refills: 2 | Status: SHIPPED | OUTPATIENT
Start: 2021-05-11 | End: 2021-05-24 | Stop reason: SDUPTHER

## 2021-05-11 RX ORDER — ALPRAZOLAM 0.25 MG/1
0.25 TABLET ORAL NIGHTLY PRN
Qty: 30 TABLET | Refills: 2 | Status: SHIPPED | OUTPATIENT
Start: 2021-05-11 | End: 2021-08-23 | Stop reason: SDUPTHER

## 2021-05-12 RX ORDER — AMLODIPINE BESYLATE 5 MG/1
5 TABLET ORAL
Qty: 90 TABLET | Refills: 1 | Status: SHIPPED | OUTPATIENT
Start: 2021-05-12 | End: 2021-12-13 | Stop reason: SDUPTHER

## 2021-05-24 DIAGNOSIS — F51.01 PRIMARY INSOMNIA: ICD-10-CM

## 2021-05-24 RX ORDER — ZOLPIDEM TARTRATE 10 MG/1
10 TABLET ORAL
Qty: 30 TABLET | Refills: 2 | Status: SHIPPED | OUTPATIENT
Start: 2021-05-24 | End: 2021-08-23 | Stop reason: SDUPTHER

## 2021-08-23 ENCOUNTER — OFFICE VISIT (OUTPATIENT)
Dept: MEDICAL GROUP | Facility: MEDICAL CENTER | Age: 46
End: 2021-08-23
Payer: COMMERCIAL

## 2021-08-23 VITALS
RESPIRATION RATE: 16 BRPM | HEART RATE: 73 BPM | TEMPERATURE: 97.7 F | BODY MASS INDEX: 29.76 KG/M2 | SYSTOLIC BLOOD PRESSURE: 132 MMHG | OXYGEN SATURATION: 94 % | HEIGHT: 70 IN | DIASTOLIC BLOOD PRESSURE: 72 MMHG | WEIGHT: 207.89 LBS

## 2021-08-23 DIAGNOSIS — E29.1 HYPOGONADISM IN MALE: ICD-10-CM

## 2021-08-23 DIAGNOSIS — F41.9 ANXIETY: ICD-10-CM

## 2021-08-23 DIAGNOSIS — R60.0 LOWER EXTREMITY EDEMA: ICD-10-CM

## 2021-08-23 DIAGNOSIS — Z00.00 ANNUAL PHYSICAL EXAM: ICD-10-CM

## 2021-08-23 DIAGNOSIS — F51.01 PRIMARY INSOMNIA: ICD-10-CM

## 2021-08-23 PROCEDURE — 99396 PREV VISIT EST AGE 40-64: CPT | Performed by: PHYSICIAN ASSISTANT

## 2021-08-23 RX ORDER — ALPRAZOLAM 0.25 MG/1
0.25 TABLET ORAL NIGHTLY PRN
Qty: 30 TABLET | Refills: 2 | Status: SHIPPED | OUTPATIENT
Start: 2021-08-23 | End: 2021-12-13 | Stop reason: SDUPTHER

## 2021-08-23 RX ORDER — ZOLPIDEM TARTRATE 10 MG/1
10 TABLET ORAL
Qty: 30 TABLET | Refills: 2 | Status: SHIPPED | OUTPATIENT
Start: 2021-08-23 | End: 2021-12-13 | Stop reason: SDUPTHER

## 2021-08-23 NOTE — ASSESSMENT & PLAN NOTE
This is a pleasant 46-year-old male requesting annual physical today.  Would like a referral to endocrinology given his low testosterone levels.  I referred him little while ago but the referral has .  Would also like renewal on some of his medications.

## 2021-08-23 NOTE — ASSESSMENT & PLAN NOTE
Also complains of occasional lower extremity swelling.  Usually his lower legs.  The symptoms will occur during days when he is not active.  Does walk 13-16,000 steps daily at work.  Was reading online about how salt may affect the edema.  He does apply salt to food that is already salted.  Denies any pain of the lower extremities.

## 2021-08-23 NOTE — PROGRESS NOTES
"Subjective:   Genaro Kunz is a 46 y.o. male here today for annual physical.    Annual physical exam  This is a pleasant 46-year-old male requesting annual physical today.  Would like a referral to endocrinology given his low testosterone levels.  I referred him little while ago but the referral has .  Would also like renewal on some of his medications.    Lower extremity edema  Also complains of occasional lower extremity swelling.  Usually his lower legs.  The symptoms will occur during days when he is not active.  Does walk 13-16,000 steps daily at work.  Was reading online about how salt may affect the edema.  He does apply salt to food that is already salted.  Denies any pain of the lower extremities.       Current medicines (including changes today)  Current Outpatient Medications   Medication Sig Dispense Refill   • ALPRAZolam (XANAX) 0.25 MG Tab Take 1 Tablet by mouth at bedtime as needed for Sleep for up to 30 days. 30 Tablet 2   • zolpidem (AMBIEN) 10 MG Tab Take 1 Tablet by mouth at bedtime for 30 days. TAKE 1 TABLET BY MOUTH AT BEDTIME AS NEEDED FOR SLEEP FOR 30 DAYS. F51.01 30 Tablet 2   • amLODIPine (NORVASC) 5 MG Tab TAKE 1 TABLET BY MOUTH EVERY DAY 90 tablet 1   • tadalafil (CIALIS) 10 MG tablet Take 1 Tab by mouth as needed for Erectile Dysfunction. 30 Tab 3     No current facility-administered medications for this visit.     He  has a past medical history of ASTHMA, Heart burn, Insomnia, Neoplasm of uncertain behavior of skin of neck (2018), and Shoulder pain, right.    Social History and Family History were reviewed and updated.    ROS   No chest pain, no shortness of breath, no abdominal pain and all other systems were reviewed and are negative.       Objective:     /72 (BP Location: Left arm, Patient Position: Sitting, BP Cuff Size: Adult)   Pulse 73   Temp 36.5 °C (97.7 °F) (Temporal)   Resp 16   Ht 1.778 m (5' 10\")   Wt 94.3 kg (207 lb 14.3 oz)   SpO2 94%  Body " mass index is 29.83 kg/m².   Physical Exam:  Constitutional: Alert, no distress.  Skin: Warm, dry, good turgor, no rashes in visible areas.  Eye: Equal, round and reactive, conjunctiva clear, lids normal.  ENMT: Lips without lesions, good dentition, oropharynx clear.  Neck: Trachea midline, no masses.   Lymph: No cervical or supraclavicular lymphadenopathy  Respiratory: Unlabored respiratory effort, lungs appear clear, no wheezes.  Cardiovascular: Regular rate rhythm, no edema.  Abdomen: Soft, non-tender, no masses.  Psych: Alert and oriented x3, normal affect and mood.        Assessment and Plan:   The following treatment plan was discussed    1. Annual physical exam  Reviewed medications as well as problems list.    2. Anxiety  Chronic condition.  Stable.  Renewed medication as directed.  - ALPRAZolam (XANAX) 0.25 MG Tab; Take 1 Tablet by mouth at bedtime as needed for Sleep for up to 30 days.  Dispense: 30 Tablet; Refill: 2    3. Primary insomnia  Chronic condition.  Stable.  Renewed medication as directed.  - zolpidem (AMBIEN) 10 MG Tab; Take 1 Tablet by mouth at bedtime for 30 days. TAKE 1 TABLET BY MOUTH AT BEDTIME AS NEEDED FOR SLEEP FOR 30 DAYS. F51.01  Dispense: 30 Tablet; Refill: 2    4. Hypogonadism in male  Chronic condition.  Referred again to endocrinology for evaluation and treatment.  - REFERRAL TO ENDOCRINOLOGY    5. Lower extremity edema  New condition noted in chart.  Symptoms appear to be intermittent.  May be secondary to salt intake.  Advised to minimize salt intake.  Exercise routinely.  We will continue to monitor symptoms.  Saw no reason to provide medication today.         Followup: Return in about 3 months (around 11/23/2021), or if symptoms worsen or fail to improve.    Please note that this dictation was created using voice recognition software. I have made every reasonable attempt to correct obvious errors, but I expect that there are errors of grammar and possibly content that I did  not discover before finalizing the note.

## 2021-09-12 ENCOUNTER — OFFICE VISIT (OUTPATIENT)
Dept: URGENT CARE | Facility: PHYSICIAN GROUP | Age: 46
End: 2021-09-12
Payer: COMMERCIAL

## 2021-09-12 ENCOUNTER — HOSPITAL ENCOUNTER (OUTPATIENT)
Facility: MEDICAL CENTER | Age: 46
End: 2021-09-12
Attending: PHYSICIAN ASSISTANT
Payer: COMMERCIAL

## 2021-09-12 VITALS
RESPIRATION RATE: 16 BRPM | WEIGHT: 202 LBS | DIASTOLIC BLOOD PRESSURE: 74 MMHG | SYSTOLIC BLOOD PRESSURE: 126 MMHG | HEIGHT: 70 IN | BODY MASS INDEX: 28.92 KG/M2 | HEART RATE: 82 BPM | OXYGEN SATURATION: 96 % | TEMPERATURE: 98.2 F

## 2021-09-12 DIAGNOSIS — B34.9 NONSPECIFIC SYNDROME SUGGESTIVE OF VIRAL ILLNESS: ICD-10-CM

## 2021-09-12 DIAGNOSIS — R05.9 COUGH: ICD-10-CM

## 2021-09-12 LAB — COVID ORDER STATUS COVID19: NORMAL

## 2021-09-12 PROCEDURE — 99214 OFFICE O/P EST MOD 30 MIN: CPT | Performed by: PHYSICIAN ASSISTANT

## 2021-09-12 PROCEDURE — U0005 INFEC AGEN DETEC AMPLI PROBE: HCPCS

## 2021-09-12 PROCEDURE — U0003 INFECTIOUS AGENT DETECTION BY NUCLEIC ACID (DNA OR RNA); SEVERE ACUTE RESPIRATORY SYNDROME CORONAVIRUS 2 (SARS-COV-2) (CORONAVIRUS DISEASE [COVID-19]), AMPLIFIED PROBE TECHNIQUE, MAKING USE OF HIGH THROUGHPUT TECHNOLOGIES AS DESCRIBED BY CMS-2020-01-R: HCPCS

## 2021-09-12 RX ORDER — BENZONATATE 200 MG/1
200 CAPSULE ORAL 3 TIMES DAILY PRN
Qty: 60 CAPSULE | Refills: 0 | Status: SHIPPED | OUTPATIENT
Start: 2021-09-12 | End: 2021-12-13

## 2021-09-12 RX ORDER — DEXTROMETHORPHAN HYDROBROMIDE AND PROMETHAZINE HYDROCHLORIDE 15; 6.25 MG/5ML; MG/5ML
5 SYRUP ORAL EVERY 4 HOURS PRN
Qty: 120 ML | Refills: 0 | Status: SHIPPED | OUTPATIENT
Start: 2021-09-12 | End: 2021-12-13

## 2021-09-12 ASSESSMENT — ENCOUNTER SYMPTOMS
COUGH: 1
SHORTNESS OF BREATH: 0
HEADACHES: 1
SWOLLEN GLANDS: 1

## 2021-09-12 NOTE — LETTER
September 12, 2021    To Whom It May Concern:         This is confirmation that Genaro Kunz attended his scheduled appointment with Edwin Brumfield P.A.-C. on 9/12/21.  His COVID-19 testing is pending.  He was instructed to quarantine in accordance with CDC guidelines.         If you have any questions please do not hesitate to call me at the phone number listed below.    Sincerely,          Edwin Brumfield P.A.-C.  [phone]

## 2021-09-12 NOTE — PROGRESS NOTES
"Subjective:   Genaro Kunz is a 46 y.o. male who presents for Sore Throat (lymp node swelling, bodyaches, cough, fatigue, x5 days )        No known covid exposure.  Unvaccinated.    Pharyngitis   This is a new problem. The current episode started in the past 7 days. The problem has been rapidly worsening. Neither side of throat is experiencing more pain than the other. There has been no fever. The pain is mild (\"scratchy\"). Associated symptoms include coughing, headaches and swollen glands. Pertinent negatives include no shortness of breath. Associated symptoms comments: Fatigue, no pain with swallowing. He has had no exposure to strep or mono. He has tried NSAIDs for the symptoms. The treatment provided mild relief.     Review of Systems   Respiratory: Positive for cough. Negative for shortness of breath.    Neurological: Positive for headaches.       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  MEDS:   Current Outpatient Medications:   •  benzonatate (TESSALON) 200 MG capsule, Take 1 Capsule by mouth 3 times a day as needed., Disp: 60 Capsule, Rfl: 0  •  promethazine-dextromethorphan (PROMETHAZINE-DM) 6.25-15 MG/5ML syrup, Take 5 mL by mouth every four hours as needed for Cough., Disp: 120 mL, Rfl: 0  •  ALPRAZolam (XANAX) 0.25 MG Tab, Take 1 Tablet by mouth at bedtime as needed for Sleep for up to 30 days., Disp: 30 Tablet, Rfl: 2  •  zolpidem (AMBIEN) 10 MG Tab, Take 1 Tablet by mouth at bedtime for 30 days. TAKE 1 TABLET BY MOUTH AT BEDTIME AS NEEDED FOR SLEEP FOR 30 DAYS. F51.01, Disp: 30 Tablet, Rfl: 2  •  amLODIPine (NORVASC) 5 MG Tab, TAKE 1 TABLET BY MOUTH EVERY DAY, Disp: 90 tablet, Rfl: 1  •  tadalafil (CIALIS) 10 MG tablet, Take 1 Tab by mouth as needed for Erectile Dysfunction., Disp: 30 Tab, Rfl: 3  ALLERGIES: No Known Allergies  SURGHX:   Past Surgical History:   Procedure Laterality " "Date   •  SHLDR ARTHROSCOP,PART ACROMIOPLAS  7/31/2019    Procedure: DECOMPRESSION, SUBACROMIAL SPACE;  Surgeon: Suresh Brock M.D.;  Location: [facility];  Service: Orthopedics   • SHOULDER ARTHROSCOPY Right 7/31/2019    Procedure: ARTHROSCOPY, SHOULDER posterior labral;  Surgeon: Suresh Brock M.D.;  Location: [facility];  Service: Orthopedics   • CLAVICLE DISTAL EXCISION  7/31/2019    Procedure: EXCISION, CLAVICLE, DISTAL - POSS OPEN, DANIELSON;  Surgeon: Suresh Brock M.D.;  Location: [facility];  Service: Orthopedics   • ROTATOR CUFF REPAIR  7/31/2019    Procedure: REPAIR, ROTATOR CUFF;  Surgeon: Suresh Brock M.D.;  Location: [facility];  Service: Orthopedics   • OPEN BICEPS TENODESIS  7/31/2019    Procedure: TENODESIS, BICEPS, OPEN;  Surgeon: Suresh Brock M.D.;  Location: [facility];  Service: Orthopedics   • TENOTOMY  7/31/2019    Procedure: TENOTOMY;  Surgeon: Suresh Brock M.D.;  Location: [facility];  Service: Orthopedics   • MANDIBLE REDUCTION CLOSED       SOCHX:  reports that he has never smoked. He has never used smokeless tobacco. He reports current alcohol use of about 1.0 oz of alcohol per week. He reports current drug use.  FH: Family history was reviewed, no pertinent findings to report   Objective:   /74 (BP Location: Right arm, Patient Position: Sitting, BP Cuff Size: Adult)   Pulse 82   Temp 36.8 °C (98.2 °F) (Temporal)   Resp 16   Ht 1.778 m (5' 10\")   Wt 91.6 kg (202 lb)   SpO2 96%   BMI 28.98 kg/m²   Physical Exam  Vitals reviewed.   Constitutional:       General: He is not in acute distress.     Appearance: Normal appearance. He is well-developed. He is not toxic-appearing.   HENT:      Head: Normocephalic and atraumatic.      Right Ear: External ear normal.      Left Ear: External ear normal.      Nose: Congestion and rhinorrhea present. Rhinorrhea is clear.      " Mouth/Throat:      Lips: Pink.      Mouth: Mucous membranes are moist.      Pharynx: Uvula midline. Posterior oropharyngeal erythema present.   Eyes:      General: Gaze aligned appropriately.   Cardiovascular:      Rate and Rhythm: Normal rate and regular rhythm.      Heart sounds: Normal heart sounds, S1 normal and S2 normal.   Pulmonary:      Effort: Pulmonary effort is normal. No respiratory distress.      Breath sounds: Normal breath sounds. No stridor. No decreased breath sounds, wheezing, rhonchi or rales.      Comments: Dry cough  Musculoskeletal:      Cervical back: Neck supple.   Lymphadenopathy:      Cervical: No cervical adenopathy.      Right cervical: No superficial cervical adenopathy.     Left cervical: No superficial cervical adenopathy.   Skin:     General: Skin is warm and dry.      Capillary Refill: Capillary refill takes less than 2 seconds.   Neurological:      Mental Status: He is alert and oriented to person, place, and time.      Comments: CN2-12 grossly intact   Psychiatric:         Speech: Speech normal.         Behavior: Behavior normal.           Assessment/Plan:   1. Nonspecific syndrome suggestive of viral illness    2. Cough  - COVID/SARS CoV-2 PCR; Future  - benzonatate (TESSALON) 200 MG capsule; Take 1 Capsule by mouth 3 times a day as needed.  Dispense: 60 Capsule; Refill: 0  - promethazine-dextromethorphan (PROMETHAZINE-DM) 6.25-15 MG/5ML syrup; Take 5 mL by mouth every four hours as needed for Cough.  Dispense: 120 mL; Refill: 0    Discussed with patient signs and symptoms most likely are due to a viral etiology.     Test for COVID-19. We will call/message back for results and appropriate further instructions. Instructed to sign up for [employer] if they have not already. Result will be released to [employer] application.   Symptomatic and supportive care:   Plenty of oral hydration and rest   Over the counter cough suppressant as directed.  Tylenol or ibuprofen for pain and fever as  directed.   Saline nasal spray and Flonase as a decongestant.   Infection control measures at home. Stay away from people, Hand washing, covering sneeze/cough, disinfect surfaces.   Remain home from work, school, and other populated environments. Work note provided with information of quarantine measures and CDC guidelines.     Differential diagnosis, natural history, supportive care, and indications for immediate follow-up discussed.

## 2021-09-13 LAB
SARS-COV-2 RNA RESP QL NAA+PROBE: NOTDETECTED
SPECIMEN SOURCE: NORMAL

## 2021-11-23 ENCOUNTER — TELEPHONE (OUTPATIENT)
Dept: MEDICAL GROUP | Facility: MEDICAL CENTER | Age: 46
End: 2021-11-23

## 2021-11-23 NOTE — TELEPHONE ENCOUNTER
Phone Number Called: [phone] (home)       Call outcome: Left detailed message for patient. Informed to call back with any additional questions.    Message: LVM to re oni missed appt THR

## 2021-12-13 ENCOUNTER — OFFICE VISIT (OUTPATIENT)
Dept: MEDICAL GROUP | Facility: MEDICAL CENTER | Age: 46
End: 2021-12-13
Payer: COMMERCIAL

## 2021-12-13 VITALS
WEIGHT: 197.4 LBS | HEIGHT: 70 IN | OXYGEN SATURATION: 95 % | DIASTOLIC BLOOD PRESSURE: 76 MMHG | BODY MASS INDEX: 28.26 KG/M2 | TEMPERATURE: 97.8 F | SYSTOLIC BLOOD PRESSURE: 118 MMHG | HEART RATE: 78 BPM

## 2021-12-13 DIAGNOSIS — F51.01 PRIMARY INSOMNIA: ICD-10-CM

## 2021-12-13 DIAGNOSIS — I10 ESSENTIAL HYPERTENSION: ICD-10-CM

## 2021-12-13 DIAGNOSIS — F41.9 ANXIETY: ICD-10-CM

## 2021-12-13 DIAGNOSIS — R79.89 LOW TESTOSTERONE: ICD-10-CM

## 2021-12-13 DIAGNOSIS — N52.9 ERECTILE DYSFUNCTION, UNSPECIFIED ERECTILE DYSFUNCTION TYPE: ICD-10-CM

## 2021-12-13 DIAGNOSIS — Z00.00 PREVENTATIVE HEALTH CARE: ICD-10-CM

## 2021-12-13 PROBLEM — D48.5 NEOPLASM OF UNCERTAIN BEHAVIOR OF SKIN OF NECK: Status: RESOLVED | Noted: 2018-02-09 | Resolved: 2021-12-13

## 2021-12-13 PROCEDURE — 99214 OFFICE O/P EST MOD 30 MIN: CPT | Performed by: PHYSICIAN ASSISTANT

## 2021-12-13 RX ORDER — TADALAFIL 10 MG/1
10 TABLET ORAL PRN
Qty: 30 TABLET | Refills: 3 | Status: SHIPPED | OUTPATIENT
Start: 2021-12-13 | End: 2022-03-04

## 2021-12-13 RX ORDER — ZOLPIDEM TARTRATE 10 MG/1
10 TABLET ORAL
Qty: 30 TABLET | Refills: 2 | Status: SHIPPED | OUTPATIENT
Start: 2021-12-13 | End: 2022-03-14 | Stop reason: SDUPTHER

## 2021-12-13 RX ORDER — ALPRAZOLAM 0.25 MG/1
0.25 TABLET ORAL NIGHTLY PRN
Qty: 30 TABLET | Refills: 2 | Status: SHIPPED | OUTPATIENT
Start: 2021-12-13 | End: 2022-03-14 | Stop reason: SDUPTHER

## 2021-12-13 RX ORDER — AMLODIPINE BESYLATE 5 MG/1
5 TABLET ORAL
Qty: 90 TABLET | Refills: 1 | Status: SHIPPED | OUTPATIENT
Start: 2021-12-13 | End: 2022-07-06

## 2021-12-14 NOTE — PROGRESS NOTES
"Subjective:   Genaro Kunz is a 46 y.o. male here today for hypertension, anxiety and insomnia.    Essential hypertension  This is a pleasant 46-year-old male here today for refills of medications.  Takes amlodipine 5 mg daily.  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  Requesting updated labs.  Also requests renewal of both Xanax and Ambien.  Takes Ambien at nighttime and Xanax during the day for anxiety.  Medications are effective.  Would also like a renewal of Cialis.       Current medicines (including changes today)  Current Outpatient Medications   Medication Sig Dispense Refill   • amLODIPine (NORVASC) 5 MG Tab Take 1 Tablet by mouth every day. 90 Tablet 1   • ALPRAZolam (XANAX) 0.25 MG Tab Take 1 Tablet by mouth at bedtime as needed for Sleep for up to 30 days. 30 Tablet 2   • zolpidem (AMBIEN) 10 MG Tab Take 1 Tablet by mouth at bedtime for 30 days. TAKE 1 TABLET BY MOUTH AT BEDTIME AS NEEDED FOR SLEEP FOR 30 DAYS. F51.01 30 Tablet 2   • tadalafil (CIALIS) 10 MG tablet Take 1 Tablet by mouth as needed for Erectile Dysfunction. 30 Tablet 3     No current facility-administered medications for this visit.     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    Social History and Family History were reviewed and updated.    ROS   No chest pain, no shortness of breath, no abdominal pain and all other systems were reviewed and are negative.       Objective:     /76 (BP Location: Right arm, Patient Position: Sitting, BP Cuff Size: Adult)   Pulse 78   Temp 36.6 °C (97.8 °F) (Temporal)   Ht 1.778 m (5' 10\")   Wt 89.5 kg (197 lb 6.4 oz)   SpO2 95%  Body mass index is 28.32 kg/m².   Physical Exam:  Constitutional: Alert, no distress.  Skin: Warm, dry, good turgor, no rashes in visible areas.  Eye: Equal, round and reactive, conjunctiva clear, lids normal.  ENMT: Lips without lesions, " good dentition, oropharynx clear.  Neck: Trachea midline, no masses.   Lymph: No cervical or supraclavicular lymphadenopathy  Respiratory: Unlabored respiratory effort, lungs appear clear, no wheezes.  Cardiovascular: Regular rate and rhythm.  Psych: Alert and oriented x3, normal affect and mood.        Assessment and Plan:   The following treatment plan was discussed    1. Essential hypertension  Chronic condition.  Controlled.  Renew Norvasc as directed.  - amLODIPine (NORVASC) 5 MG Tab; Take 1 Tablet by mouth every day.  Dispense: 90 Tablet; Refill: 1    2. Anxiety  Chronic condition.  Stable.  Renewed Xanax as directed.  Sent over a 90-day supply with separate monthly refills.  - ALPRAZolam (XANAX) 0.25 MG Tab; Take 1 Tablet by mouth at bedtime as needed for Sleep for up to 30 days.  Dispense: 30 Tablet; Refill: 2    3. Low testosterone  Chronic condition.  Will need to update status and check testosterone profile.  Perform between 7 and 9 AM.  Last testosterone level in the low one hundreds.  - Testosterone, Free & Total, Adult Male (w/SHBG); Future    4. Primary insomnia  Chronic condition.  Stable.   reviewed.  Renewed Ambien as directed.  Sent over a 90-day supply with separate monthly refills.  - zolpidem (AMBIEN) 10 MG Tab; Take 1 Tablet by mouth at bedtime for 30 days. TAKE 1 TABLET BY MOUTH AT BEDTIME AS NEEDED FOR SLEEP FOR 30 DAYS. F51.01  Dispense: 30 Tablet; Refill: 2    5. Erectile dysfunction, unspecified erectile dysfunction type  Chronic condition.  Stable.  Renewed Cialis.  - tadalafil (CIALIS) 10 MG tablet; Take 1 Tablet by mouth as needed for Erectile Dysfunction.  Dispense: 30 Tablet; Refill: 3    6. Preventative health care  Order labs fasting.  He will be contacted with the results.  Fast 8 hours.  - Lipid Profile; Future  - CBC WITH DIFFERENTIAL; Future  - HEMOGLOBIN A1C; Future  - Comp Metabolic Panel; Future  - TSH WITH REFLEX TO FT4; Future         Followup: Return in about 3 months  (around 3/13/2022), or if symptoms worsen or fail to improve.    Please note that this dictation was created using voice recognition software. I have made every reasonable attempt to correct obvious errors, but I expect that there are errors of grammar and possibly content that I did not discover before finalizing the note.

## 2021-12-14 NOTE — ASSESSMENT & PLAN NOTE
This is a pleasant 46-year-old male here today for refills of medications.  Takes amlodipine 5 mg daily.  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  Requesting updated labs.  Also requests renewal of both Xanax and Ambien.  Takes Ambien at nighttime and Xanax during the day for anxiety.  Medications are effective.  Would also like a renewal of Cialis.

## 2022-02-07 PROBLEM — U07.1 COVID-19 VIRUS INFECTION: Status: ACTIVE | Noted: 2022-02-07

## 2022-03-03 DIAGNOSIS — N52.9 ERECTILE DYSFUNCTION, UNSPECIFIED ERECTILE DYSFUNCTION TYPE: ICD-10-CM

## 2022-03-04 RX ORDER — TADALAFIL 10 MG/1
10 TABLET ORAL PRN
Qty: 30 TABLET | Refills: 3 | Status: SHIPPED | OUTPATIENT
Start: 2022-03-04

## 2022-03-11 ENCOUNTER — APPOINTMENT (OUTPATIENT)
Dept: MEDICAL GROUP | Facility: MEDICAL CENTER | Age: 47
End: 2022-03-11
Payer: COMMERCIAL

## 2022-03-14 ENCOUNTER — OFFICE VISIT (OUTPATIENT)
Dept: MEDICAL GROUP | Facility: MEDICAL CENTER | Age: 47
End: 2022-03-14
Payer: COMMERCIAL

## 2022-03-14 VITALS
OXYGEN SATURATION: 97 % | BODY MASS INDEX: 29.32 KG/M2 | TEMPERATURE: 97.6 F | HEIGHT: 70 IN | DIASTOLIC BLOOD PRESSURE: 90 MMHG | HEART RATE: 64 BPM | SYSTOLIC BLOOD PRESSURE: 122 MMHG | WEIGHT: 204.81 LBS

## 2022-03-14 DIAGNOSIS — F41.9 ANXIETY: ICD-10-CM

## 2022-03-14 DIAGNOSIS — F51.01 PRIMARY INSOMNIA: ICD-10-CM

## 2022-03-14 PROBLEM — Z86.16 HISTORY OF COVID-19: Status: ACTIVE | Noted: 2022-02-07

## 2022-03-14 PROCEDURE — 99214 OFFICE O/P EST MOD 30 MIN: CPT | Performed by: PHYSICIAN ASSISTANT

## 2022-03-14 RX ORDER — ALPRAZOLAM 0.25 MG/1
0.25 TABLET ORAL NIGHTLY PRN
Qty: 30 TABLET | Refills: 2 | Status: SHIPPED | OUTPATIENT
Start: 2022-03-14 | End: 2022-07-18 | Stop reason: SDUPTHER

## 2022-03-14 RX ORDER — ZOLPIDEM TARTRATE 10 MG/1
10 TABLET ORAL
Qty: 30 TABLET | Refills: 2 | Status: SHIPPED | OUTPATIENT
Start: 2022-03-14 | End: 2022-05-11 | Stop reason: SDUPTHER

## 2022-03-14 NOTE — ASSESSMENT & PLAN NOTE
This is a pleasant 46-year-old male here today to follow-up on his health.  Is currently in escrow and looking to purchase his first home in [place].  Will be buying it with his daughter and her .  He is requesting a refill today of Xanax.  Takes it daily to help with anxiety.  Anxiety is stable.

## 2022-03-14 NOTE — PROGRESS NOTES
"Subjective:   Genaro Kunz is a 46 y.o. male here today for anxiety and insomnia.    Anxiety  This is a pleasant 46-year-old male here today to follow-up on his health.  Is currently in escrow and looking to purchase his first home in [place].  Will be buying it with his daughter and her .  He is requesting a refill today of Xanax.  Takes it daily to help with anxiety.  Anxiety is stable.    Primary insomnia  Chronic condition.  Takes Ambien 10 mg daily.  Medication is effective.  Requesting a refill.    Did not perform labs ordered in December.  Still has the lab orders.       Current medicines (including changes today)  Current Outpatient Medications   Medication Sig Dispense Refill   • ALPRAZolam (XANAX) 0.25 MG Tab Take 1 Tablet by mouth at bedtime as needed for Sleep for up to 30 days. 30 Tablet 2   • zolpidem (AMBIEN) 10 MG Tab Take 1 Tablet by mouth at bedtime for 30 days. TAKE 1 TABLET BY MOUTH AT BEDTIME AS NEEDED FOR SLEEP FOR 30 DAYS. F51.01 30 Tablet 2   • tadalafil (CIALIS) 10 MG tablet TAKE 1 TABLET BY MOUTH AS NEEDED FOR ERECTILE DYSFUNCTION 30 Tablet 3   • amLODIPine (NORVASC) 5 MG Tab Take 1 Tablet by mouth every day. 90 Tablet 1     No current facility-administered medications for this visit.     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    Social History and Family History were reviewed and updated.    ROS   No chest pain, no shortness of breath, no abdominal pain and all other systems were reviewed and are negative.       Objective:     /90 (BP Location: Left arm, Patient Position: Sitting, BP Cuff Size: Adult)   Pulse 64   Temp 36.4 °C (97.6 °F) (Temporal)   Ht 1.778 m (5' 10\")   Wt 92.9 kg (204 lb 12.9 oz)   SpO2 97%  Body mass index is 29.39 kg/m².   Physical Exam:  Constitutional: Alert, no distress.  Skin: Warm, dry, good turgor, no rashes in visible " areas.  Eye: Equal, round and reactive, conjunctiva clear, lids normal.  ENMT: Lips without lesions, good dentition, oropharynx clear.  Neck: Trachea midline, no masses.   Lymph: No cervical or supraclavicular lymphadenopathy  Respiratory: Unlabored respiratory effort, lungs appear clear, no wheezes.  Cardiovascular: Regular rate and rhythm.  Psych: Alert and oriented x3, normal affect and mood.        Assessment and Plan:   The following treatment plan was discussed    1. Anxiety  Chronic condition.  Stable.   reviewed.  Medication appropriate.  Renewed Xanax for daily use for anxiety.  - ALPRAZolam (XANAX) 0.25 MG Tab; Take 1 Tablet by mouth at bedtime as needed for Sleep for up to 30 days.  Dispense: 30 Tablet; Refill: 2    2. Primary insomnia  Chronic condition.  Stable.  Renewed Ambien 10 mg to take nightly.   reviewed.  Sent over 90-day supply with separate monthly refills.  - zolpidem (AMBIEN) 10 MG Tab; Take 1 Tablet by mouth at bedtime for 30 days. TAKE 1 TABLET BY MOUTH AT BEDTIME AS NEEDED FOR SLEEP FOR 30 DAYS. F51.01  Dispense: 30 Tablet; Refill: 2     In 3 months we will check his living arrangement.  If he is stable in his home I will order labs again.      Followup: Return in about 3 months (around 6/14/2022), or if symptoms worsen or fail to improve.    Please note that this dictation was created using voice recognition software. I have made every reasonable attempt to correct obvious errors, but I expect that there are errors of grammar and possibly content that I did not discover before finalizing the note.

## 2022-03-14 NOTE — ASSESSMENT & PLAN NOTE
Chronic condition.  Takes Ambien 10 mg daily.  Medication is effective.  Requesting a refill.    Did not perform labs ordered in December.  Still has the lab orders.

## 2022-04-14 ENCOUNTER — SUPERVISING PHYSICIAN REVIEW (OUTPATIENT)
Dept: MEDICAL GROUP | Facility: MEDICAL CENTER | Age: 47
End: 2022-04-14
Payer: COMMERCIAL

## 2022-04-14 NOTE — PROGRESS NOTES
I have reviewed history, assessment and plan for office encounter on 03/14/2022 with Advanced Practice Provider: Bello Choi P.A.-C.  Face to face encounter/direct observation: No    Suggested changes to plan or follow-up:    - consider alternative treatments (SSRI, SNRI, psychotherapy) for general anxiety disorder treatment as benzodiazepines are not preferred for long term treatment   - obtain UDS and controlled substance agreement   - consider tapering Xanax and Ambein dose off due to Marijuana use      Alesha Mccall M.D.

## 2022-05-10 ENCOUNTER — PATIENT MESSAGE (OUTPATIENT)
Dept: MEDICAL GROUP | Facility: MEDICAL CENTER | Age: 47
End: 2022-05-10
Payer: COMMERCIAL

## 2022-05-10 DIAGNOSIS — F51.01 PRIMARY INSOMNIA: ICD-10-CM

## 2022-05-11 RX ORDER — ZOLPIDEM TARTRATE 10 MG/1
10 TABLET ORAL
Qty: 30 TABLET | Refills: 1 | Status: SHIPPED | OUTPATIENT
Start: 2022-05-11 | End: 2022-06-10

## 2022-05-11 NOTE — PATIENT COMMUNICATION
Phone Number Called: [phone]    Call outcome: Spoke to patient regarding message below.    Message: Contacted pt to inform him that his refill request for zolpidem (AMBIEN) 10 MG Tab was refilled.

## 2022-06-13 ENCOUNTER — TELEPHONE (OUTPATIENT)
Dept: MEDICAL GROUP | Facility: MEDICAL CENTER | Age: 47
End: 2022-06-13

## 2022-06-13 NOTE — TELEPHONE ENCOUNTER
Phone Number Called: [phone] (home)       Call outcome: Left detailed message for patient. Informed to call back with any additional questions.    Message: LVM to re oni missed appt THR

## 2022-07-06 DIAGNOSIS — I10 ESSENTIAL HYPERTENSION: ICD-10-CM

## 2022-07-06 RX ORDER — AMLODIPINE BESYLATE 5 MG/1
5 TABLET ORAL
Qty: 90 TABLET | Refills: 1 | Status: SHIPPED | OUTPATIENT
Start: 2022-07-06 | End: 2022-07-18 | Stop reason: SDUPTHER

## 2022-07-18 ENCOUNTER — OFFICE VISIT (OUTPATIENT)
Dept: MEDICAL GROUP | Facility: MEDICAL CENTER | Age: 47
End: 2022-07-18
Payer: COMMERCIAL

## 2022-07-18 VITALS
SYSTOLIC BLOOD PRESSURE: 120 MMHG | HEIGHT: 70 IN | TEMPERATURE: 97.9 F | DIASTOLIC BLOOD PRESSURE: 76 MMHG | HEART RATE: 80 BPM | OXYGEN SATURATION: 94 % | BODY MASS INDEX: 29.55 KG/M2 | WEIGHT: 206.4 LBS

## 2022-07-18 DIAGNOSIS — F41.9 ANXIETY: ICD-10-CM

## 2022-07-18 DIAGNOSIS — Z79.899 CHRONIC USE OF BENZODIAZEPINE FOR THERAPEUTIC PURPOSE: ICD-10-CM

## 2022-07-18 DIAGNOSIS — F51.01 PRIMARY INSOMNIA: ICD-10-CM

## 2022-07-18 DIAGNOSIS — I10 ESSENTIAL HYPERTENSION: ICD-10-CM

## 2022-07-18 PROCEDURE — 99214 OFFICE O/P EST MOD 30 MIN: CPT | Performed by: PHYSICIAN ASSISTANT

## 2022-07-18 RX ORDER — ZOLPIDEM TARTRATE 10 MG/1
10 TABLET ORAL
Qty: 30 TABLET | Refills: 2 | Status: CANCELLED | OUTPATIENT
Start: 2022-07-18 | End: 2022-08-17

## 2022-07-18 RX ORDER — ALPRAZOLAM 0.25 MG/1
0.25 TABLET ORAL DAILY
Qty: 30 TABLET | Refills: 2 | Status: SHIPPED | OUTPATIENT
Start: 2022-07-18 | End: 2023-01-30

## 2022-07-18 RX ORDER — AMLODIPINE BESYLATE 5 MG/1
5 TABLET ORAL
Qty: 90 TABLET | Refills: 1 | Status: SHIPPED | OUTPATIENT
Start: 2022-07-18 | End: 2022-07-18 | Stop reason: SDUPTHER

## 2022-07-18 RX ORDER — ZOLPIDEM TARTRATE 10 MG/1
10 TABLET ORAL
Qty: 30 TABLET | Refills: 2 | Status: SHIPPED | OUTPATIENT
Start: 2022-07-18 | End: 2022-11-21 | Stop reason: SDUPTHER

## 2022-07-18 RX ORDER — AMLODIPINE BESYLATE 5 MG/1
5 TABLET ORAL
Qty: 90 TABLET | Refills: 1 | Status: SHIPPED | OUTPATIENT
Start: 2022-07-18 | End: 2022-11-21 | Stop reason: SDUPTHER

## 2022-07-18 NOTE — ASSESSMENT & PLAN NOTE
This is a pleasant 47-year-old male here today to follow-up on medication renewal.  Chronic history of insomnia.  Takes Ambien 10 mg at bedtime.  Medication is effective.  Requesting a refill.

## 2022-07-18 NOTE — ASSESSMENT & PLAN NOTE
Also chronic history of anxiety.  Has tried SSRIs in the past.  No improvement.  Prefers to take Xanax as needed.  Takes it daily for his symptoms.  Symptoms are reduced with benzo use.  He is due for urine drug screen as well as signing of a controlled substance agreement.

## 2022-07-18 NOTE — PROGRESS NOTES
"Subjective:   Genaro Kunz is a 47 y.o. male here today for insomnia, anxiety, chronic use of benzos and hypertension.    Primary insomnia  This is a pleasant 47-year-old male here today to follow-up on medication renewal.  Chronic history of insomnia.  Takes Ambien 10 mg at bedtime.  Medication is effective.  Requesting a refill.    Anxiety  Also chronic history of anxiety.  Has tried SSRIs in the past.  No improvement.  Prefers to take Xanax as needed.  Takes it daily for his symptoms.  Symptoms are reduced with benzo use.  He is due for urine drug screen as well as signing of a controlled substance agreement.       Current medicines (including changes today)  Current Outpatient Medications   Medication Sig Dispense Refill   • zolpidem (AMBIEN) 10 MG Tab Take 1 Tablet by mouth at bedtime for 30 days. TAKE 1 TABLET BY MOUTH AT BEDTIME AS NEEDED FOR SLEEP FOR 30 DAYS. F51.01 30 Tablet 2   • ALPRAZolam (XANAX) 0.25 MG Tab Take 1 Tablet by mouth every day for 30 days. 30 Tablet 2   • amLODIPine (NORVASC) 5 MG Tab Take 1 Tablet by mouth every day. 90 Tablet 1   • tadalafil (CIALIS) 10 MG tablet TAKE 1 TABLET BY MOUTH AS NEEDED FOR ERECTILE DYSFUNCTION 30 Tablet 3     No current facility-administered medications for this visit.     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    Social History and Family History were reviewed and updated.    ROS   No chest pain, no shortness of breath, no abdominal pain and all other systems were reviewed and are negative.       Objective:     /76 (BP Location: Right arm, Patient Position: Sitting, BP Cuff Size: Adult)   Pulse 80   Temp 36.6 °C (97.9 °F) (Temporal)   Ht 1.778 m (5' 10\")   Wt 93.6 kg (206 lb 6.4 oz)   SpO2 94%  Body mass index is 29.62 kg/m².   Physical Exam:  Constitutional: Alert, no distress.  Skin: Warm, dry, good turgor, no rashes in visible " areas.  Eye: Equal, round and reactive, conjunctiva clear, lids normal.  ENMT: Lips without lesions, good dentition, oropharynx clear.  Neck: Trachea midline, no masses.   Lymph: No cervical or supraclavicular lymphadenopathy  Respiratory: Unlabored respiratory effort.  Psych: Alert and oriented x3, normal affect and mood.        Assessment and Plan:   The following treatment plan was discussed    1. Primary insomnia  Chronic condition.  Stable.   reviewed.  Renewed Ambien as directed.  Sent over 90-day supply with separate monthly refills.  - zolpidem (AMBIEN) 10 MG Tab; Take 1 Tablet by mouth at bedtime for 30 days. TAKE 1 TABLET BY MOUTH AT BEDTIME AS NEEDED FOR SLEEP FOR 30 DAYS. F51.01  Dispense: 30 Tablet; Refill: 2    2. Anxiety  Chronic condition.  Stable.  Renewed Xanax as directed.   reviewed.  - ALPRAZolam (XANAX) 0.25 MG Tab; Take 1 Tablet by mouth every day for 30 days.  Dispense: 30 Tablet; Refill: 2    3. Chronic use of benzodiazepine for therapeutic purpose  Controlled substance agreement was signed today.  We will perform urine drug screen at a later date.  - Controlled Substance Treatment Agreement    4. Essential hypertension  Chronic condition.  Well-controlled.  Renewed amlodipine as directed.  Advised that labs are still valid and will  in December.  He will perform them at the lab draw for [facility] in [place].  - amLODIPine (NORVASC) 5 MG Tab; Take 1 Tablet by mouth every day.  Dispense: 90 Tablet; Refill: 1         Followup: Return in about 3 months (around 10/18/2022), or if symptoms worsen or fail to improve.    Please note that this dictation was created using voice recognition software. I have made every reasonable attempt to correct obvious errors, but I expect that there are errors of grammar and possibly content that I did not discover before finalizing the note.

## 2022-08-10 ENCOUNTER — OFFICE VISIT (OUTPATIENT)
Dept: URGENT CARE | Facility: PHYSICIAN GROUP | Age: 47
End: 2022-08-10
Payer: COMMERCIAL

## 2022-08-10 ENCOUNTER — APPOINTMENT (OUTPATIENT)
Dept: URGENT CARE | Facility: PHYSICIAN GROUP | Age: 47
End: 2022-08-10

## 2022-08-10 VITALS
OXYGEN SATURATION: 95 % | SYSTOLIC BLOOD PRESSURE: 130 MMHG | HEART RATE: 76 BPM | BODY MASS INDEX: 28.98 KG/M2 | RESPIRATION RATE: 16 BRPM | WEIGHT: 207 LBS | HEIGHT: 71 IN | DIASTOLIC BLOOD PRESSURE: 68 MMHG | TEMPERATURE: 97.4 F

## 2022-08-10 DIAGNOSIS — S76.012A STRAIN OF LEFT HIP, INITIAL ENCOUNTER: ICD-10-CM

## 2022-08-10 PROCEDURE — 99213 OFFICE O/P EST LOW 20 MIN: CPT

## 2022-08-10 NOTE — PROGRESS NOTES
Subjective     Genaro Kunz is a 47 y.o. male who presents with Hip Pain (Burning pain in hip joint. X 3 or 4 hours last night. Pain has slightly subsided. Feels a bit achy )            HPI    Patient presents with left hip pain that started last night.  He reports his left hip pain to be burning when it started, 8 out of 10 at the worst.  His pain currently is achy, 2-3 out of 10.  He reports that burning pain lasted for about 4 hours.  He took some ibuprofen, which provided some relief.  Pain is nonradiating, aggravated by walking, relieved by lying supine and taking ibuprofen.  He denies previous injury or surgery to the back.  He denies any tingling, numbness, or weakness on the lower extremities.  Patient denies any mechanisms of injury.  He reports driving for 6 hours today prior to symptoms.  He denies any leg pain or swelling.    Patient's current problem list, medications, and past medical/surgical history were reviewed in Epic.    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  MEDS:   Current Outpatient Medications:     zolpidem (AMBIEN) 10 MG Tab, Take 1 Tablet by mouth at bedtime for 30 days. TAKE 1 TABLET BY MOUTH AT BEDTIME AS NEEDED FOR SLEEP FOR 30 DAYS. F51.01, Disp: 30 Tablet, Rfl: 2    ALPRAZolam (XANAX) 0.25 MG Tab, Take 1 Tablet by mouth every day for 30 days., Disp: 30 Tablet, Rfl: 2    amLODIPine (NORVASC) 5 MG Tab, Take 1 Tablet by mouth every day., Disp: 90 Tablet, Rfl: 1    tadalafil (CIALIS) 10 MG tablet, TAKE 1 TABLET BY MOUTH AS NEEDED FOR ERECTILE DYSFUNCTION, Disp: 30 Tablet, Rfl: 3  ALLERGIES: No Known Allergies  SURGHX:   Past Surgical History:   Procedure Laterality Date    AZ SHLDR ARTHROSCOP,PART ACROMIOPLAS  7/31/2019    Procedure: DECOMPRESSION, SUBACROMIAL SPACE;  Surgeon: Suresh Brock M.D.;  Location: SURGERY [facility];  Service: Orthopedics    SHOULDER  "ARTHROSCOPY Right 7/31/2019    Procedure: ARTHROSCOPY, SHOULDER posterior labral;  Surgeon: Suresh Brock M.D.;  Location: [facility];  Service: Orthopedics    CLAVICLE DISTAL EXCISION  7/31/2019    Procedure: EXCISION, CLAVICLE, DISTAL - POSS OPEN, DANIELSON;  Surgeon: Suresh Brock M.D.;  Location: [facility];  Service: Orthopedics    ROTATOR CUFF REPAIR  7/31/2019    Procedure: REPAIR, ROTATOR CUFF;  Surgeon: Suresh Brock M.D.;  Location: [facility];  Service: Orthopedics    OPEN BICEPS TENODESIS  7/31/2019    Procedure: TENODESIS, BICEPS, OPEN;  Surgeon: Suresh Brock M.D.;  Location: [facility];  Service: Orthopedics    TENOTOMY  7/31/2019    Procedure: TENOTOMY;  Surgeon: Suresh Brock M.D.;  Location: [facility];  Service: Orthopedics    MANDIBLE REDUCTION CLOSED       SOCHX:  reports that he has never smoked. He has never used smokeless tobacco. He reports current alcohol use of about 1.0 oz per week. He reports current drug use.  FH: Reviewed with patient, not pertinent to this visit.       ROS           Objective     /68   Pulse 76   Temp 36.3 °C (97.4 °F) (Temporal)   Resp 16   Ht 1.803 m (5' 11\")   Wt 93.9 kg (207 lb)   SpO2 95%   BMI 28.87 kg/m²      Physical Exam  Constitutional:       Appearance: Normal appearance.   HENT:      Head: Normocephalic.      Nose: Nose normal.   Eyes:      Extraocular Movements: Extraocular movements intact.   Cardiovascular:      Rate and Rhythm: Normal rate.      Pulses: Normal pulses.   Pulmonary:      Effort: Pulmonary effort is normal.   Musculoskeletal:         General: Normal range of motion.      Cervical back: Normal range of motion.      Right hip: Normal.      Left hip: No tenderness. Normal range of motion.   Skin:     General: Skin is warm.   Neurological:      General: No focal deficit present.      Mental Status: He is alert.   Psychiatric:         Mood and " Affect: Mood normal.         Behavior: Behavior normal.                       Assessment & Plan        1. Strain of left hip, initial encounter      Patient's presentation is consistent with a left hip strain.  Current limitation in range of motion, no motor or sensory deficits.  Recommended RICE (rest, ice, compression, elevation).  May take ibuprofen up to 800 mg every 8 hours as needed for pain relief.  Advised to apply ice or heat to the area.  May apply topical analgesics or patches for additional pain relief. Discussed treatment plan with patient, he is agreeable and verbalized understanding.  Educated patient on signs and symptoms watch out for, when to return to clinic or go to the ER.    Work note provided.    Electronically Signed by XIOMARA Avendaño

## 2022-08-10 NOTE — LETTER
"ELVA  [facility] [address] [address]     August 10, 2022    Patient: Genaro Kunz   YOB: 1975   Date of Visit: 8/10/2022       To Whom It May Concern:    Genaro Kunz was seen and treated in our department on 8/10/2022.     Sincerely,     Mari Caba \"Frank\" XIOMARA Egan                 "

## 2022-09-27 ENCOUNTER — HOSPITAL ENCOUNTER (OUTPATIENT)
Dept: RADIOLOGY | Facility: MEDICAL CENTER | Age: 47
End: 2022-09-27
Attending: PHYSICIAN ASSISTANT
Payer: COMMERCIAL

## 2022-09-27 ENCOUNTER — OFFICE VISIT (OUTPATIENT)
Dept: MEDICAL GROUP | Facility: MEDICAL CENTER | Age: 47
End: 2022-09-27
Payer: COMMERCIAL

## 2022-09-27 VITALS
HEIGHT: 71 IN | HEART RATE: 71 BPM | WEIGHT: 201 LBS | OXYGEN SATURATION: 98 % | DIASTOLIC BLOOD PRESSURE: 80 MMHG | TEMPERATURE: 97.4 F | BODY MASS INDEX: 28.14 KG/M2 | SYSTOLIC BLOOD PRESSURE: 130 MMHG

## 2022-09-27 DIAGNOSIS — M54.42 ACUTE BILATERAL LOW BACK PAIN WITH LEFT-SIDED SCIATICA: ICD-10-CM

## 2022-09-27 PROCEDURE — 99213 OFFICE O/P EST LOW 20 MIN: CPT | Performed by: PHYSICIAN ASSISTANT

## 2022-09-27 RX ORDER — TIZANIDINE HYDROCHLORIDE 4 MG/1
CAPSULE, GELATIN COATED ORAL
COMMUNITY
Start: 2022-09-21

## 2022-09-27 NOTE — ASSESSMENT & PLAN NOTE
This is a pleasant 47-year-old male here today complaining of acute low back pain on the left side.  Strained his back while reaching into his car to grab a backpack.  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  Does have a chronic history of dealing with lower back pain in the similar area.  It has been years since he had such a flare.  Symptoms began on the 21st.  He was seen at [facility] on the same day.  Diagnosed with sciatica.  Placed on diclofenac and tizanidine.  Did not  the medications yet.  Diclofenac was not covered.  In the past he was on Flexeril but that caused him to feel significant fatigue.  He returned to work that night and then appears that off on the 24th until the 29th.  He states the first day of symptoms the sciatica was worse.  A shock down the leg.  Now there is a tightness on that side.  Denies any saddle anesthesia.  Was hopeful at [facility] of getting an MRI but was told it likely would not be covered.

## 2022-09-27 NOTE — PROGRESS NOTES
Subjective:   Genaro Kunz is a 47 y.o. male here today for acute low back pain with left-sided sciatica.    Acute bilateral low back pain with left-sided sciatica  This is a pleasant 47-year-old male here today complaining of acute low back pain on the left side.  Strained his back while reaching into his car to grab a backpack.  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  Does have a chronic history of dealing with lower back pain in the similar area.  It has been years since he had such a flare.  Symptoms began on the 21st.  He was seen at [facility] on the same day.  Diagnosed with sciatica.  Placed on diclofenac and tizanidine.  Did not  the medications yet.  Diclofenac was not covered.  In the past he was on Flexeril but that caused him to feel significant fatigue.  He returned to work that night and then appears that off on the 24th until the 29th.  He states the first day of symptoms the sciatica was worse.  A shock down the leg.  Now there is a tightness on that side.  Denies any saddle anesthesia.  Was hopeful at [facility] of getting an MRI but was told it likely would not be covered.         Current medicines (including changes today)  Current Outpatient Medications   Medication Sig Dispense Refill    tizanidine (ZANAFLEX) 4 MG capsule       amLODIPine (NORVASC) 5 MG Tab Take 1 Tablet by mouth every day. 90 Tablet 1    tadalafil (CIALIS) 10 MG tablet TAKE 1 TABLET BY MOUTH AS NEEDED FOR ERECTILE DYSFUNCTION 30 Tablet 3     No current facility-administered medications for this visit.     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    Social History and Family History were reviewed and updated.    ROS   No chest pain, no shortness of breath, no abdominal pain and all other systems were reviewed and are negative.        "Objective:     /80 (BP Location: Right arm, Patient Position: Sitting, BP Cuff Size: Adult)   Pulse 71   Temp 36.3 °C (97.4 °F) (Temporal)   Ht 1.803 m (5' 11\")   Wt 91.2 kg (201 lb)   SpO2 98%  Body mass index is 28.03 kg/m².   Physical Exam:  Constitutional: Alert, no distress.  Skin: Warm, dry, good turgor, no rashes in visible areas.  Eye: Equal, round and reactive, conjunctiva clear, lids normal.  ENMT: Lips without lesions, good dentition, oropharynx clear.  Neck: Trachea midline, no masses.   Lymph: No cervical or supraclavicular lymphadenopathy  Respiratory: Unlabored respiratory effort, lungs appear clear.  Psych: Alert and oriented x3, normal affect and mood.        Assessment and Plan:   The following treatment plan was discussed    1. Acute bilateral low back pain with left-sided sciatica  Acute, new onset condition.  History of chronic low back pain without sciatica.  May continue ibuprofen product as directed.  Discussed possible side effects using tizanidine.  May take it for muscle spasms.  Advised to continue to stretch and walk.  Ordered x-ray as well as referred to PT.  If symptoms are not improving then an MRI would be ordered.  I do expect his back pain to eventually improve and resolve completely.  I see him next month so we will follow-up then.  May contact me through [employer] with any concerns or questions.  - Referral to Physical Therapy  - DX-LUMBAR SPINE-2 OR 3 VIEWS; Future         Followup: No follow-ups on file.    Please note that this dictation was created using voice recognition software. I have made every reasonable attempt to correct obvious errors, but I expect that there are errors of grammar and possibly content that I did not discover before finalizing the note.             "

## 2022-10-17 ENCOUNTER — APPOINTMENT (OUTPATIENT)
Dept: MEDICAL GROUP | Facility: MEDICAL CENTER | Age: 47
End: 2022-10-17
Payer: COMMERCIAL

## 2022-11-21 DIAGNOSIS — F51.01 PRIMARY INSOMNIA: ICD-10-CM

## 2022-11-21 RX ORDER — ZOLPIDEM TARTRATE 10 MG/1
10 TABLET ORAL
Qty: 30 TABLET | Refills: 2 | Status: SHIPPED | OUTPATIENT
Start: 2022-11-21 | End: 2022-12-21

## 2022-12-12 ENCOUNTER — OFFICE VISIT (OUTPATIENT)
Dept: MEDICAL GROUP | Facility: MEDICAL CENTER | Age: 47
End: 2022-12-12
Payer: COMMERCIAL

## 2022-12-12 VITALS
HEART RATE: 75 BPM | SYSTOLIC BLOOD PRESSURE: 132 MMHG | BODY MASS INDEX: 28.64 KG/M2 | WEIGHT: 204.59 LBS | TEMPERATURE: 97.4 F | OXYGEN SATURATION: 94 % | DIASTOLIC BLOOD PRESSURE: 86 MMHG | HEIGHT: 71 IN

## 2022-12-12 DIAGNOSIS — Z12.11 COLON CANCER SCREENING: ICD-10-CM

## 2022-12-12 DIAGNOSIS — F41.9 ANXIETY: ICD-10-CM

## 2022-12-12 DIAGNOSIS — G43.009 MIGRAINE WITHOUT AURA AND WITHOUT STATUS MIGRAINOSUS, NOT INTRACTABLE: ICD-10-CM

## 2022-12-12 PROCEDURE — 99214 OFFICE O/P EST MOD 30 MIN: CPT | Performed by: PHYSICIAN ASSISTANT

## 2022-12-12 RX ORDER — SUMATRIPTAN 50 MG/1
50 TABLET, FILM COATED ORAL
Qty: 9 TABLET | Refills: 5 | Status: SHIPPED | OUTPATIENT
Start: 2022-12-12 | End: 2023-06-06

## 2022-12-12 RX ORDER — TOPIRAMATE SPINKLE 25 MG/1
25 CAPSULE ORAL DAILY
Qty: 30 CAPSULE | Refills: 5 | Status: SHIPPED | OUTPATIENT
Start: 2022-12-12 | End: 2023-01-04

## 2022-12-12 NOTE — PROGRESS NOTES
Subjective:   Genaro Kunz is a 47 y.o. male here today for migraines and anxiety.    Migraine without aura and without status migrainosus, not intractable  This is a pleasant 47-year-old male here today to discuss migraines.  Migraines have gotten a lot worse over the past 6 months.  He has a history of taking sumatriptan hand and what we believe is Topamax.  He is also requesting FMLA for intermittent leave given the headaches.  He states he may be out twice a month.  Does take Excedrin Migraine and ibuprofen but recently the headaches have worsened.  States there is pain across the frontal sinuses and pain behind the eye.  Symptoms tend to trigger from the back of his neck.  He has associated photophobia and nauseousness.    Anxiety  Chronic condition.  [place] [facility] [place] [place].  I will no longer be seeing him.  At least he will be closer to home in [place].       Current medicines (including changes today)  Current Outpatient Medications   Medication Sig Dispense Refill    SUMAtriptan (IMITREX) 50 MG Tab Take 1 Tablet by mouth one time as needed for Migraine for up to 1 dose. May take 1 tab 2 hours later if still with headache. 9 Tablet 5    topiramate (TOPAMAX) 25 MG capsule Take 1 Capsule by mouth every day for 30 days. 30 Capsule 5    amLODIPine (NORVASC) 5 MG Tab Take 1 Tablet by mouth every day. 90 Tablet 1    zolpidem (AMBIEN) 10 MG Tab Take 1 Tablet by mouth at bedtime for 30 days. TAKE 1 TABLET BY MOUTH AT BEDTIME AS NEEDED FOR SLEEP FOR 30 DAYS. F51.01 30 Tablet 2    tizanidine (ZANAFLEX) 4 MG capsule       tadalafil (CIALIS) 10 MG tablet TAKE 1 TABLET BY MOUTH AS NEEDED FOR ERECTILE DYSFUNCTION 30 Tablet 3     No current facility-administered medications for this visit.     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    Social History and Family  "History were reviewed and updated.    ROS   No chest pain, no shortness of breath, no abdominal pain and all other systems were reviewed and are negative.       Objective:     /86 (BP Location: Left arm, Patient Position: Sitting, BP Cuff Size: Adult)   Pulse 75   Temp 36.3 °C (97.4 °F) (Temporal)   Ht 1.803 m (5' 11\")   Wt 92.8 kg (204 lb 9.4 oz)   SpO2 94%  Body mass index is 28.53 kg/m².   Physical Exam:  Constitutional: Alert, no distress.  Skin: Warm, dry, good turgor, no rashes in visible areas.  Eye: Equal, round and reactive, conjunctiva clear, lids normal.  ENMT: Lips without lesions, good dentition, oropharynx clear.  Neck: Trachea midline, no masses.   Lymph: No cervical or supraclavicular lymphadenopathy  Respiratory: Unlabored respiratory effort, lungs appear clear.  Psych: Alert and oriented x3, normal affect and mood.        Assessment and Plan:   The following treatment plan was discussed    1. Migraine without aura and without status migrainosus, not intractable  Chronic condition but new condition noted in chart.  Prescribed Imitrex and Topamax as directed.  Referred to neurology.  He is requesting [facility] paperwork for intermittent leave.  Follow-up with new PCP with [facility].  - SUMAtriptan (IMITREX) 50 MG Tab; Take 1 Tablet by mouth one time as needed for Migraine for up to 1 dose. May take 1 tab 2 hours later if still with headache.  Dispense: 9 Tablet; Refill: 5  - topiramate (TOPAMAX) 25 MG capsule; Take 1 Capsule by mouth every day for 30 days.  Dispense: 30 Capsule; Refill: 5  - Referral to Neurology    2. Anxiety  Chronic condition.  Stable.  He will follow-up with a new PCP in [place] with [facility].    3. Colon cancer screening  Referred to GI for colon cancer screening.  - Referral to GI for Colonoscopy     [facility] paperwork was completed and faxed to [place].    Followup: Return if symptoms worsen or fail to improve, for Follow-up with new PCP.    Please note that " this dictation was created using voice recognition software. I have made every reasonable attempt to correct obvious errors, but I expect that there are errors of grammar and possibly content that I did not discover before finalizing the note.

## 2022-12-12 NOTE — ASSESSMENT & PLAN NOTE
This is a pleasant 47-year-old male here today to discuss migraines.  Migraines have gotten a lot worse over the past 6 months.  He has a history of taking sumatriptan hand and what we believe is Topamax.  He is also requesting [place] for intermittent leave given the headaches.  He states he may be out twice a month.  Does take Excedrin Migraine and ibuprofen but recently the headaches have worsened.  States there is pain across the frontal sinuses and pain behind the eye.  Symptoms tend to trigger from the back of his neck.  He has associated photophobia and nauseousness.

## 2022-12-12 NOTE — ASSESSMENT & PLAN NOTE
Chronic condition.  He has an appointment with [facility] primary care in April.  I will no longer be seeing him.  At least he will be closer to home in [place].

## 2023-01-04 DIAGNOSIS — G43.009 MIGRAINE WITHOUT AURA AND WITHOUT STATUS MIGRAINOSUS, NOT INTRACTABLE: ICD-10-CM

## 2023-01-04 RX ORDER — TOPIRAMATE SPINKLE 25 MG/1
CAPSULE ORAL
Qty: 30 CAPSULE | Refills: 5 | Status: SHIPPED | OUTPATIENT
Start: 2023-01-04 | End: 2023-07-10

## 2023-01-05 ENCOUNTER — TELEPHONE (OUTPATIENT)
Dept: HEALTH INFORMATION MANAGEMENT | Facility: OTHER | Age: 48
End: 2023-01-05

## 2023-01-28 DIAGNOSIS — F41.9 ANXIETY: ICD-10-CM

## 2023-01-30 RX ORDER — ALPRAZOLAM 0.25 MG/1
TABLET ORAL
Qty: 30 TABLET | Refills: 0 | Status: SHIPPED | OUTPATIENT
Start: 2023-01-30 | End: 2023-03-08

## 2023-06-06 DIAGNOSIS — G43.009 MIGRAINE WITHOUT AURA AND WITHOUT STATUS MIGRAINOSUS, NOT INTRACTABLE: ICD-10-CM

## 2023-06-06 RX ORDER — SUMATRIPTAN 50 MG/1
TABLET, FILM COATED ORAL
Qty: 9 TABLET | Refills: 5 | Status: SHIPPED | OUTPATIENT
Start: 2023-06-06 | End: 2023-12-28

## 2023-06-25 DIAGNOSIS — I10 ESSENTIAL HYPERTENSION: ICD-10-CM

## 2023-06-26 RX ORDER — AMLODIPINE BESYLATE 5 MG/1
5 TABLET ORAL
Qty: 90 TABLET | Refills: 1 | Status: SHIPPED | OUTPATIENT
Start: 2023-06-26

## 2023-10-12 DIAGNOSIS — G43.009 MIGRAINE WITHOUT AURA AND WITHOUT STATUS MIGRAINOSUS, NOT INTRACTABLE: ICD-10-CM

## 2023-10-12 RX ORDER — TOPIRAMATE SPINKLE 25 MG/1
CAPSULE ORAL
Qty: 90 CAPSULE | Refills: 0 | Status: SHIPPED | OUTPATIENT
Start: 2023-10-12

## 2023-12-28 DIAGNOSIS — G43.009 MIGRAINE WITHOUT AURA AND WITHOUT STATUS MIGRAINOSUS, NOT INTRACTABLE: ICD-10-CM

## 2023-12-28 RX ORDER — SUMATRIPTAN 50 MG/1
TABLET, FILM COATED ORAL
Qty: 9 TABLET | Refills: 5 | Status: SHIPPED | OUTPATIENT
Start: 2023-12-28

## 2024-04-10 DIAGNOSIS — I10 ESSENTIAL HYPERTENSION: ICD-10-CM

## 2024-04-11 RX ORDER — AMLODIPINE BESYLATE 5 MG/1
5 TABLET ORAL
Qty: 90 TABLET | Refills: 1 | Status: SHIPPED | OUTPATIENT
Start: 2024-04-11

## 2024-04-11 NOTE — TELEPHONE ENCOUNTER
AMLODIPINE BESYLATE 5 MG TAB   Received request via: Pharmacy    Was the patient seen in the last year in this department? Yes    Does the patient have an active prescription (recently filled or refills available) for medication(s) requested? No    Pharmacy Name: [facility]/pharmacy #[id number] - [place], [place] - [address]     Does the patient have snf Plus and need 100 day supply (blood pressure, diabetes and cholesterol meds only)? Patient does not have SCP

## 2024-05-06 ENCOUNTER — APPOINTMENT (OUTPATIENT)
Dept: MEDICAL GROUP | Facility: MEDICAL CENTER | Age: 49
End: 2024-05-06
Payer: COMMERCIAL

## 2024-05-06 ENCOUNTER — HOSPITAL ENCOUNTER (OUTPATIENT)
Facility: MEDICAL CENTER | Age: 49
End: 2024-05-06
Attending: PHYSICIAN ASSISTANT
Payer: COMMERCIAL

## 2024-05-06 VITALS
RESPIRATION RATE: 16 BRPM | WEIGHT: 213 LBS | HEIGHT: 71 IN | HEART RATE: 82 BPM | BODY MASS INDEX: 29.82 KG/M2 | TEMPERATURE: 97.6 F | DIASTOLIC BLOOD PRESSURE: 80 MMHG | SYSTOLIC BLOOD PRESSURE: 138 MMHG

## 2024-05-06 DIAGNOSIS — Z51.81 ENCOUNTER FOR MEDICATION MONITORING: ICD-10-CM

## 2024-05-06 DIAGNOSIS — R60.0 EDEMA OF RIGHT FOOT: ICD-10-CM

## 2024-05-06 DIAGNOSIS — Z00.00 PREVENTATIVE HEALTH CARE: ICD-10-CM

## 2024-05-06 DIAGNOSIS — N52.9 ERECTILE DYSFUNCTION, UNSPECIFIED ERECTILE DYSFUNCTION TYPE: ICD-10-CM

## 2024-05-06 DIAGNOSIS — Z12.5 SCREENING PSA (PROSTATE SPECIFIC ANTIGEN): ICD-10-CM

## 2024-05-06 DIAGNOSIS — F51.01 PRIMARY INSOMNIA: ICD-10-CM

## 2024-05-06 DIAGNOSIS — I10 ESSENTIAL HYPERTENSION: ICD-10-CM

## 2024-05-06 PROBLEM — M54.42 ACUTE BILATERAL LOW BACK PAIN WITH LEFT-SIDED SCIATICA: Status: RESOLVED | Noted: 2022-09-27 | Resolved: 2024-05-06

## 2024-05-06 PROBLEM — Z86.16 HISTORY OF COVID-19: Status: RESOLVED | Noted: 2022-02-07 | Resolved: 2024-05-06

## 2024-05-06 PROCEDURE — 3079F DIAST BP 80-89 MM HG: CPT | Performed by: PHYSICIAN ASSISTANT

## 2024-05-06 PROCEDURE — 99214 OFFICE O/P EST MOD 30 MIN: CPT | Performed by: PHYSICIAN ASSISTANT

## 2024-05-06 PROCEDURE — 3075F SYST BP GE 130 - 139MM HG: CPT | Performed by: PHYSICIAN ASSISTANT

## 2024-05-06 RX ORDER — TADALAFIL 10 MG/1
10 TABLET ORAL PRN
Qty: 30 TABLET | Refills: 3 | Status: SHIPPED | OUTPATIENT
Start: 2024-05-06

## 2024-05-06 RX ORDER — RIMEGEPANT SULFATE 75 MG/75MG
75 TABLET, ORALLY DISINTEGRATING ORAL PRN
COMMUNITY
End: 2024-05-10 | Stop reason: SDUPTHER

## 2024-05-06 ASSESSMENT — PATIENT HEALTH QUESTIONNAIRE - PHQ9: CLINICAL INTERPRETATION OF PHQ2 SCORE: 0

## 2024-05-06 NOTE — PROGRESS NOTES
Subjective:     History of Present Illness  The patient presents to the office for follow-up of multiple medical concerns.    The patient requires a refill of his Ambien prescription at a later date.    The patient underwent a Cologuard test approximately a year ago, which yielded a negative result. He has experienced weight gain and continues to manage his blood pressure with amlodipine 5 mg.    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 He has transitioned from Topamax to Nurtec 75 mg as needed.    The patient expresses a desire to transition from Cialis to a longer-acting medication, citing a lapse in his supply of tadalafil.    Supplemental Information  He broke his heel and had surgery. He still has swelling on one side of his foot. He has been using compression socks. If he sits for too long, it makes him very stiff, but walking too much is not the problem. He was told that the swelling would persist for 6 months post-surgery.   He has received one shingles vaccine.      Current medicines (including changes today)  Current Outpatient Medications   Medication Sig Dispense Refill    Rimegepant Sulfate (NURTEC) 75 MG TABLET DISPERSIBLE Take 75 mg by mouth as needed.      tadalafil (CIALIS) 10 MG tablet Take 1 Tablet by mouth as needed for Erectile Dysfunction. 30 Tablet 3    amLODIPine (NORVASC) 5 MG Tab TAKE 1 TABLET BY MOUTH EVERY DAY 90 Tablet 1    tizanidine (ZANAFLEX) 4 MG capsule        No current facility-administered medications for this visit.     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    ROS   No chest pain, no shortness of breath, no abdominal pain  Positive ROS as per HPI.  All other systems reviewed and are negative.     Objective:     /80 (BP Location: Left arm, Patient Position: Sitting, BP Cuff Size: Adult)   " Pulse 82   Temp 36.4 °C (97.6 °F) (Temporal)   Resp 16   Ht 1.803 m (5' 11\")   Wt 96.6 kg (213 lb)  Body mass index is 29.71 kg/m².   Physical Exam  Vital Signs  The patient's weight is 216.  Constitutional: Alert, no distress.  Skin: Warm, dry, good turgor, no rashes in visible areas.  Eye: Equal, round and reactive, conjunctiva clear, lids normal.  ENMT: Lips without lesions, good dentition, oropharynx clear.  Neck: Trachea midline, no masses, no thyromegaly. No cervical or supraclavicular lymphadenopathy  Respiratory: Unlabored respiratory effort, lungs clear to auscultation, no wheezes, no ronchi.  Cardiovascular: Normal S1, S2, no murmur, no edema.  Abdomen: Soft, non-tender, no masses, no hepatosplenomegaly.  Psych: Alert and oriented x3, normal affect and mood.      Results          Assessment and Plan:   The following treatment plan was discussed    Assessment & Plan  1. Chronic primary insomnia.  The patient's Ambien prescription will be due for renewal at a later date. The patient has been advised to sign a controlled substcrement and undergo a urine drug screen. He will communicate with me via [employer] if a renewal is required. At that point, I will renew his Ambien 10 mg prescription with a 30-day supply and two refills. Given his residence in [facility], we will schedule biannual follow-ups.    2. Chronic hypertension, currently stable.  A blood pressure recheck will be conducted in 6 months. The patient will maintain his current regimen of amlodipine.    3. Edema of the right foot, status post fracture.  The condition is stable. Continued monitoring will be conducted.    4. Preventative healthcare.  Fasting labs have been ordered, with a recommendation for 8-hour fasting. The patient will be contacted with the results. Additionally, a PSA test has been ordered.    5. Erectile Dysfunction (ED).  Tadalafil prescription will be renewed as directed. A coupon for GoodRx has been printed for the " patient.      ORDERS:  1. Primary insomnia      2. Encounter for medication monitoring    - Controlled Substance Treatment Agreement  - URINE DRUG SCREEN W/CONF (SEND OUT); Future    3. Essential hypertension      4. Edema of right foot      5. Preventative health care    - CBC WITH DIFFERENTIAL; Future  - Comp Metabolic Panel; Future  - Lipid Profile; Future  - HEMOGLOBIN A1C; Future    6. Screening PSA (prostate specific antigen)    - PROSTATE SPECIFIC AG SCREENING; Future    7. Erectile dysfunction, unspecified erectile dysfunction type    - tadalafil (CIALIS) 10 MG tablet; Take 1 Tablet by mouth as needed for Erectile Dysfunction.  Dispense: 30 Tablet; Refill: 3        Please note that this dictation was created using voice recognition software. I have made every reasonable attempt to correct obvious errors, but I expect that there are errors of grammar and possibly content that I did not discover before finalizing the note.      Attestation      Verbal consent was acquired by the patient to use [employer] ambient listening note generation during this visit Yes

## 2024-05-08 LAB
AMPHET CTO UR CFM-MCNC: NEGATIVE NG/ML
BARBITURATES CTO UR CFM-MCNC: NEGATIVE NG/ML
BENZODIAZ CTO UR CFM-MCNC: NEGATIVE NG/ML
CANNABINOIDS CTO UR CFM-MCNC: NORMAL NG/ML
COCAINE CTO UR CFM-MCNC: NEGATIVE NG/ML
CREAT UR-MCNC: 172.8 MG/DL (ref 20–400)
DRUG COMMENT 753798: NORMAL
METHADONE CTO UR CFM-MCNC: NEGATIVE NG/ML
OPIATES CTO UR CFM-MCNC: NEGATIVE NG/ML
PCP CTO UR CFM-MCNC: NEGATIVE NG/ML
PROPOXYPH CTO UR CFM-MCNC: NEGATIVE NG/ML

## 2024-05-10 DIAGNOSIS — G43.009 MIGRAINE WITHOUT AURA AND WITHOUT STATUS MIGRAINOSUS, NOT INTRACTABLE: ICD-10-CM

## 2024-05-10 LAB — THC UR CFM-MCNC: 109 NG/ML

## 2024-05-10 RX ORDER — RIMEGEPANT SULFATE 75 MG/75MG
75 TABLET, ORALLY DISINTEGRATING ORAL PRN
Qty: 30 TABLET | Refills: 0 | Status: SHIPPED | OUTPATIENT
Start: 2024-05-10 | End: 2024-06-09

## 2024-05-14 ENCOUNTER — HOSPITAL ENCOUNTER (OUTPATIENT)
Dept: LAB | Facility: MEDICAL CENTER | Age: 49
End: 2024-05-14
Attending: PHYSICIAN ASSISTANT
Payer: COMMERCIAL

## 2024-05-14 DIAGNOSIS — Z12.5 SCREENING PSA (PROSTATE SPECIFIC ANTIGEN): ICD-10-CM

## 2024-05-14 DIAGNOSIS — Z00.00 PREVENTATIVE HEALTH CARE: ICD-10-CM

## 2024-05-14 LAB
ALBUMIN SERPL BCP-MCNC: 4.3 G/DL (ref 3.2–4.9)
ALBUMIN/GLOB SERPL: 1.7 G/DL
ALP SERPL-CCNC: 78 U/L (ref 30–99)
ALT SERPL-CCNC: 23 U/L (ref 2–50)
ANION GAP SERPL CALC-SCNC: 12 MMOL/L (ref 7–16)
AST SERPL-CCNC: 19 U/L (ref 12–45)
BASOPHILS # BLD AUTO: 1 % (ref 0–1.8)
BASOPHILS # BLD: 0.08 K/UL (ref 0–0.12)
BILIRUB SERPL-MCNC: 0.3 MG/DL (ref 0.1–1.5)
BUN SERPL-MCNC: 15 MG/DL (ref 8–22)
CALCIUM ALBUM COR SERPL-MCNC: 9.4 MG/DL (ref 8.5–10.5)
CALCIUM SERPL-MCNC: 9.6 MG/DL (ref 8.5–10.5)
CHLORIDE SERPL-SCNC: 106 MMOL/L (ref 96–112)
CHOLEST SERPL-MCNC: 253 MG/DL (ref 100–199)
CO2 SERPL-SCNC: 24 MMOL/L (ref 20–33)
CREAT SERPL-MCNC: 0.9 MG/DL (ref 0.5–1.4)
EOSINOPHIL # BLD AUTO: 0.23 K/UL (ref 0–0.51)
EOSINOPHIL NFR BLD: 2.8 % (ref 0–6.9)
ERYTHROCYTE [DISTWIDTH] IN BLOOD BY AUTOMATED COUNT: 43.9 FL (ref 35.9–50)
EST. AVERAGE GLUCOSE BLD GHB EST-MCNC: 117 MG/DL
FASTING STATUS PATIENT QL REPORTED: NORMAL
GFR SERPLBLD CREATININE-BSD FMLA CKD-EPI: 103 ML/MIN/1.73 M 2
GLOBULIN SER CALC-MCNC: 2.6 G/DL (ref 1.9–3.5)
GLUCOSE SERPL-MCNC: 92 MG/DL (ref 65–99)
HBA1C MFR BLD: 5.7 % (ref 4–5.6)
HCT VFR BLD AUTO: 47.3 % (ref 42–52)
HDLC SERPL-MCNC: 47 MG/DL
HGB BLD-MCNC: 16 G/DL (ref 14–18)
IMM GRANULOCYTES # BLD AUTO: 0.03 K/UL (ref 0–0.11)
IMM GRANULOCYTES NFR BLD AUTO: 0.4 % (ref 0–0.9)
LDLC SERPL CALC-MCNC: 181 MG/DL
LYMPHOCYTES # BLD AUTO: 3.64 K/UL (ref 1–4.8)
LYMPHOCYTES NFR BLD: 44.3 % (ref 22–41)
MCH RBC QN AUTO: 29.2 PG (ref 27–33)
MCHC RBC AUTO-ENTMCNC: 33.8 G/DL (ref 32.3–36.5)
MCV RBC AUTO: 86.3 FL (ref 81.4–97.8)
MONOCYTES # BLD AUTO: 0.7 K/UL (ref 0–0.85)
MONOCYTES NFR BLD AUTO: 8.5 % (ref 0–13.4)
NEUTROPHILS # BLD AUTO: 3.53 K/UL (ref 1.82–7.42)
NEUTROPHILS NFR BLD: 43 % (ref 44–72)
NRBC # BLD AUTO: 0 K/UL
NRBC BLD-RTO: 0 /100 WBC (ref 0–0.2)
PLATELET # BLD AUTO: 318 K/UL (ref 164–446)
PMV BLD AUTO: 10.4 FL (ref 9–12.9)
POTASSIUM SERPL-SCNC: 3.8 MMOL/L (ref 3.6–5.5)
PROT SERPL-MCNC: 6.9 G/DL (ref 6–8.2)
PSA SERPL-MCNC: 0.58 NG/ML (ref 0–4)
RBC # BLD AUTO: 5.48 M/UL (ref 4.7–6.1)
SODIUM SERPL-SCNC: 142 MMOL/L (ref 135–145)
TRIGL SERPL-MCNC: 126 MG/DL (ref 0–149)
WBC # BLD AUTO: 8.2 K/UL (ref 4.8–10.8)

## 2024-06-15 DIAGNOSIS — G43.009 MIGRAINE WITHOUT AURA AND WITHOUT STATUS MIGRAINOSUS, NOT INTRACTABLE: ICD-10-CM

## 2024-06-17 RX ORDER — RIMEGEPANT SULFATE 75 MG/75MG
75 TABLET, ORALLY DISINTEGRATING ORAL PRN
Qty: 30 TABLET | Refills: 0 | Status: SHIPPED | OUTPATIENT
Start: 2024-06-17 | End: 2024-07-17

## 2024-07-11 ENCOUNTER — PATIENT MESSAGE (OUTPATIENT)
Dept: MEDICAL GROUP | Facility: MEDICAL CENTER | Age: 49
End: 2024-07-11
Payer: COMMERCIAL

## 2024-08-01 ENCOUNTER — PATIENT MESSAGE (OUTPATIENT)
Dept: MEDICAL GROUP | Facility: MEDICAL CENTER | Age: 49
End: 2024-08-01

## 2024-08-01 ENCOUNTER — OFFICE VISIT (OUTPATIENT)
Dept: URGENT CARE | Facility: PHYSICIAN GROUP | Age: 49
End: 2024-08-01
Payer: COMMERCIAL

## 2024-08-01 VITALS
BODY MASS INDEX: 29.71 KG/M2 | OXYGEN SATURATION: 97 % | RESPIRATION RATE: 16 BRPM | HEIGHT: 71 IN | SYSTOLIC BLOOD PRESSURE: 128 MMHG | DIASTOLIC BLOOD PRESSURE: 78 MMHG | HEART RATE: 74 BPM | TEMPERATURE: 98.8 F

## 2024-08-01 DIAGNOSIS — R68.89 FLU-LIKE SYMPTOMS: ICD-10-CM

## 2024-08-01 LAB
FLUAV RNA SPEC QL NAA+PROBE: NEGATIVE
FLUBV RNA SPEC QL NAA+PROBE: NEGATIVE
RSV RNA SPEC QL NAA+PROBE: NEGATIVE
S PYO DNA SPEC NAA+PROBE: NOT DETECTED
SARS-COV-2 RNA RESP QL NAA+PROBE: POSITIVE

## 2024-08-01 PROCEDURE — 87651 STREP A DNA AMP PROBE: CPT | Performed by: NURSE PRACTITIONER

## 2024-08-01 PROCEDURE — 0241U POCT CEPHEID COV-2, FLU A/B, RSV - PCR: CPT | Performed by: NURSE PRACTITIONER

## 2024-08-01 PROCEDURE — 3078F DIAST BP <80 MM HG: CPT | Performed by: NURSE PRACTITIONER

## 2024-08-01 PROCEDURE — 99213 OFFICE O/P EST LOW 20 MIN: CPT | Performed by: NURSE PRACTITIONER

## 2024-08-01 PROCEDURE — 3074F SYST BP LT 130 MM HG: CPT | Performed by: NURSE PRACTITIONER

## 2024-08-01 RX ORDER — IMIQUIMOD 12.5 MG/.25G
CREAM TOPICAL
COMMUNITY
Start: 2024-06-26 | End: 2024-08-01

## 2024-08-01 RX ORDER — VALACYCLOVIR HYDROCHLORIDE 1 G/1
TABLET, FILM COATED ORAL
COMMUNITY
Start: 2024-05-14 | End: 2024-08-01

## 2024-08-01 RX ORDER — SILDENAFIL CITRATE 20 MG/1
TABLET ORAL
COMMUNITY
Start: 2024-07-02 | End: 2024-08-01

## 2024-08-01 RX ORDER — ZOLPIDEM TARTRATE 10 MG/1
TABLET ORAL
COMMUNITY
Start: 2024-07-30

## 2024-08-01 RX ORDER — RIMEGEPANT SULFATE 75 MG/75MG
TABLET, ORALLY DISINTEGRATING ORAL
COMMUNITY
Start: 2024-07-24 | End: 2024-08-01

## 2024-08-01 NOTE — PROGRESS NOTES
"Subjective:   Genaro Kunz is a 49 y.o. male who presents for Body Aches (X 1 day), Chills, Cough, Pharyngitis, and Sinus Pain (X 1 day)    Patient is a 49-year-old male presenting clinic today reporting 1 day history of bodyaches, headaches, fever, Chills, dry cough, nasal congestion with sinus pain and pressure.  He denies any chest pain, phlegm production with cough, shortness of breath, nausea, vomiting, or diarrhea.  Patient states that he does work at [employer] and his daughter recently had similar symptoms.  Patient is not up-to-date on COVID vaccinations.  He has been taking over-the-counter cold and flu medications with some symptom improvement.      Medications, Allergies, and current problem list reviewed today in Epic.     Objective:     /78   Pulse 74   Temp 37.1 °C (98.8 °F) (Temporal)   Resp 16   Ht 1.803 m (5' 11\")   SpO2 97%     Physical Exam  Vitals reviewed.   Constitutional:       General: He is not in acute distress.     Appearance: Normal appearance. He is ill-appearing. He is not toxic-appearing.   HENT:      Head: Normocephalic.      Right Ear: Tympanic membrane, ear canal and external ear normal.      Left Ear: Tympanic membrane, ear canal and external ear normal.      Nose: Mucosal edema, congestion and rhinorrhea present. Rhinorrhea is clear.      Right Turbinates: Swollen.      Left Turbinates: Swollen.      Right Sinus: No maxillary sinus tenderness or frontal sinus tenderness.      Left Sinus: No maxillary sinus tenderness or frontal sinus tenderness.      Mouth/Throat:      Lips: Pink. No lesions.      Mouth: Mucous membranes are moist.      Pharynx: Oropharynx is clear. Uvula midline. Posterior oropharyngeal erythema present. No oropharyngeal exudate.   Eyes:      Extraocular Movements: Extraocular movements intact.      Conjunctiva/sclera: Conjunctivae normal.      Pupils: Pupils are equal, round, and reactive to light.   Cardiovascular:      Rate and Rhythm: Normal " rate and regular rhythm.      Heart sounds: No murmur heard.  Pulmonary:      Effort: Pulmonary effort is normal. No respiratory distress.      Breath sounds: Normal breath sounds. No stridor. No wheezing, rhonchi or rales.   Musculoskeletal:         General: Normal range of motion.      Cervical back: Normal range of motion and neck supple.   Lymphadenopathy:      Cervical: No cervical adenopathy.   Skin:     General: Skin is warm and dry.   Neurological:      General: No focal deficit present.      Mental Status: He is alert and oriented to person, place, and time.   Psychiatric:         Mood and Affect: Mood normal.         Behavior: Behavior normal.       Results for orders placed or performed in visit on 08/01/24   POCT Cepheid Group A Strep - PCR   Result Value Ref Range    POC Group A Strep, PCR Not Detected Not Detected, Invalid   POCT Cepheid CoV-2, Flu A/B, RSV - PCR   Result Value Ref Range    SARS-CoV-2 by PCR Positive (A) Negative, Invalid    Influenza virus A RNA Negative Negative, Invalid    Influenza virus B, PCR Negative Negative, Invalid    RSV, PCR Negative Negative, Invalid       Assessment/Plan:     Diagnosis and associated orders:     1. Flu-like symptoms  POCT Cepheid Group A Strep - PCR    POCT Cepheid CoV-2, Flu A/B, RSV - PCR         Comments/MDM:     POCT COVID test positive  Patient nontoxic-appearing and is in no acute distress.  Lung sounds are clear, low suspicion at this time for pneumonia.  Vital signs are reasonable.Patient presents clinic today with mild COVID symptoms.  OTC Tylenol or Motrin for fever/discomfort,  cough/cold medication for symptomatic relief  OTC Supportive Care for Congestion - saline nasal spray or neti pot  Drink plenty of fluids, and electrolyte drink supplementation.  Advised the patient to stay at home under self-isolation according to CDC guidelines.  Work  note provided  Follow-up with PCP  Return to clinic or go to the ED if symptoms worsen or fail to  improve, or if the patient should develop worsening/increasing cough, congestion, ear pain, sore throat, shortness of breath, wheezing, chest pain, fever/chills, and/or any concerning symptoms.  Patient was involved with shared decision-making throughout the exam today and verbalizes understanding regards to plan of care, discharge instructions, and follow-up         Differential diagnosis, natural history, supportive care, and indications for immediate follow-up discussed.    Advised the patient to follow-up with the primary care physician for recheck, reevaluation, and consideration of further management.    I personally reviewed prior external notes and test results pertinent to today's visit as well as additional imaging and testing completed in clinic today.     Please note that this dictation was created using voice recognition software. I have made a reasonable attempt to correct obvious errors, but I expect that there are errors of grammar and possibly content that I did not discover before finalizing the note.

## 2024-08-01 NOTE — LETTER
[facility]  [address]  [facility] [id number]     August 1, 2024    Patient: Genaro Kunz   YOB: 1975   Date of Visit: 8/1/2024       To Whom It May Concern:    Genaro Kunz was seen and treated in our department on 8/1/2024.  Patient will need to be excused from work due to illness and may return on 8/5/2024 as long as he is 24 hours fever free.    Sincerely,     SALINA Pina.

## 2024-08-22 NOTE — PROGRESS NOTES
Pt arrived to unit via wheelchair, accompanied by Post-op RN. Alert and awake, able to answer all questions during admission interview process. Steady on his gait, ambulated to BR for elimination. Call light use for assistance and needs encouraged.POC discussed- safety , oxygenation and OBS status/ verbalized understanding. Will continue to monitor.   Schedule ASC follow up with Mya Galindo in 3-4 weeks.

## 2024-08-30 ENCOUNTER — OFFICE VISIT (OUTPATIENT)
Dept: URGENT CARE | Facility: PHYSICIAN GROUP | Age: 49
End: 2024-08-30
Payer: COMMERCIAL

## 2024-08-30 VITALS
BODY MASS INDEX: 29.99 KG/M2 | DIASTOLIC BLOOD PRESSURE: 82 MMHG | HEIGHT: 70 IN | WEIGHT: 209.5 LBS | TEMPERATURE: 97.4 F | HEART RATE: 73 BPM | RESPIRATION RATE: 18 BRPM | OXYGEN SATURATION: 96 % | SYSTOLIC BLOOD PRESSURE: 126 MMHG

## 2024-08-30 DIAGNOSIS — B96.89 ACUTE BACTERIAL SINUSITIS: ICD-10-CM

## 2024-08-30 DIAGNOSIS — J01.90 ACUTE BACTERIAL SINUSITIS: ICD-10-CM

## 2024-08-30 RX ORDER — METHYLPREDNISOLONE 4 MG
TABLET, DOSE PACK ORAL
Qty: 21 TABLET | Refills: 0 | Status: SHIPPED | OUTPATIENT
Start: 2024-08-30

## 2024-08-30 ASSESSMENT — FIBROSIS 4 INDEX: FIB4 SCORE: 0.64

## 2024-08-30 NOTE — PROGRESS NOTES
"Verbal consent was acquired by the patient to use [employer] ambient listening note generation during this visit.    Subjective:     HPI:   History of Present Illness  The patient is a 49-year-old male who presents for evaluation of a sinus infection.    He has been experiencing severe nasal congestion since his recovery from COVID-19, which was his second bout with the virus. The first infection was mild and felt like allergies, but the second was more severe. He has attempted to alleviate the congestion with various sinus medications, a Neti pot, saline solution, hot teas, spicy teas, and hot sauce. Afrin nasal spray has been used twice daily, once in the morning and once after work, which has provided some relief.     He experiences pain and pressure in his sinuses, which intensifies when lying down and does not improve upon standing. A humidifier and steam inhalation have also been tried without success.     He had a similar episode of severe sinus infection around the same time last year, which lasted for a month. He underwent surgery on 10/02/2023 and did not experience any nasal congestion until the recent episode. He has been a mouth breather throughout his life and often feels as though he is not getting enough oxygen.  He denies fever or chills. No body aches.     ALLERGIES  The patient has no known allergies to antibiotics.      Objective:     Exam:  /82   Pulse 73   Temp 36.3 °C (97.4 °F) (Temporal)   Resp 18   Ht 1.778 m (5' 10\")   Wt 95 kg (209 lb 8 oz)   SpO2 96%   BMI 30.06 kg/m²  Body mass index is 30.06 kg/m².    Physical Exam  Vitals and nursing note reviewed.   Constitutional:       General: He is not in acute distress.     Appearance: Normal appearance. He is not ill-appearing.   HENT:      Head: Normocephalic and atraumatic.      Nose: Nasal tenderness and congestion present.      Right Nostril: Occlusion present.      Left Nostril: Occlusion present.      Right Turbinates: " Enlarged.      Left Turbinates: Enlarged.      Right Sinus: Maxillary sinus tenderness and frontal sinus tenderness present.      Left Sinus: Maxillary sinus tenderness and frontal sinus tenderness present.   Cardiovascular:      Rate and Rhythm: Normal rate.      Pulses: Normal pulses.   Pulmonary:      Effort: Pulmonary effort is normal.   Musculoskeletal:      Cervical back: Normal range of motion.   Neurological:      Mental Status: He is alert.           Assessment & Plan:     1. Acute bacterial sinusitis  amoxicillin-clavulanate (AUGMENTIN) 875-125 MG Tab    methylPREDNISolone (MEDROL DOSEPAK) 4 MG Tablet Therapy Pack          Assessment & Plan  1.  Acute bacterial sinusitis.   Provided patient with information on the etiology & pathogenesis of bacterial sinusitis. Recommend cool mist humidifier at home, use nasal saline wash (i.e. Nedi-Pot), may take OTC decongestant prn, and antibiotics as prescribed. Tylenol/Motrin prn HA or discomfort. RTC for fever >4d, no improvement within 48-72h, or for any other questions or concerns.               Kirti Staley, HARDY.P.R.N.      Please note that this dictation was created using voice recognition software. I have made every reasonable attempt to correct obvious errors, but I expect that there are errors of grammar and possibly content that I did not discover before finalizing the note.

## 2024-10-15 DIAGNOSIS — I10 ESSENTIAL HYPERTENSION: ICD-10-CM

## 2024-10-15 DIAGNOSIS — G43.009 MIGRAINE WITHOUT AURA AND WITHOUT STATUS MIGRAINOSUS, NOT INTRACTABLE: ICD-10-CM

## 2024-10-15 RX ORDER — RIMEGEPANT SULFATE 75 MG/75MG
75 TABLET, ORALLY DISINTEGRATING ORAL PRN
Qty: 8 TABLET | Refills: 3 | Status: SHIPPED | OUTPATIENT
Start: 2024-10-15 | End: 2024-11-14

## 2024-10-15 RX ORDER — AMLODIPINE BESYLATE 5 MG/1
5 TABLET ORAL
Qty: 90 TABLET | Refills: 1 | Status: SHIPPED | OUTPATIENT
Start: 2024-10-15

## 2024-10-28 ENCOUNTER — OFFICE VISIT (OUTPATIENT)
Dept: MEDICAL GROUP | Facility: MEDICAL CENTER | Age: 49
End: 2024-10-28
Payer: COMMERCIAL

## 2024-10-28 VITALS
WEIGHT: 203.8 LBS | SYSTOLIC BLOOD PRESSURE: 130 MMHG | HEIGHT: 70 IN | TEMPERATURE: 96.8 F | HEART RATE: 88 BPM | DIASTOLIC BLOOD PRESSURE: 80 MMHG | OXYGEN SATURATION: 97 % | BODY MASS INDEX: 29.18 KG/M2

## 2024-10-28 DIAGNOSIS — F51.01 PRIMARY INSOMNIA: ICD-10-CM

## 2024-10-28 DIAGNOSIS — R73.03 PREDIABETES: ICD-10-CM

## 2024-10-28 DIAGNOSIS — E78.5 HYPERLIPIDEMIA LDL GOAL <100: ICD-10-CM

## 2024-10-28 PROCEDURE — 99214 OFFICE O/P EST MOD 30 MIN: CPT | Performed by: PHYSICIAN ASSISTANT

## 2024-10-28 PROCEDURE — 3075F SYST BP GE 130 - 139MM HG: CPT | Performed by: PHYSICIAN ASSISTANT

## 2024-10-28 PROCEDURE — 3079F DIAST BP 80-89 MM HG: CPT | Performed by: PHYSICIAN ASSISTANT

## 2024-10-28 RX ORDER — ZOLPIDEM TARTRATE 10 MG/1
10 TABLET ORAL NIGHTLY PRN
Qty: 30 TABLET | Refills: 5 | Status: SHIPPED | OUTPATIENT
Start: 2024-10-28 | End: 2024-11-27

## 2024-10-28 ASSESSMENT — FIBROSIS 4 INDEX: FIB4 SCORE: 0.64

## 2025-03-01 ENCOUNTER — OFFICE VISIT (OUTPATIENT)
Dept: URGENT CARE | Facility: PHYSICIAN GROUP | Age: 50
End: 2025-03-01
Payer: COMMERCIAL

## 2025-03-01 ENCOUNTER — RESULTS FOLLOW-UP (OUTPATIENT)
Dept: URGENT CARE | Facility: PHYSICIAN GROUP | Age: 50
End: 2025-03-01

## 2025-03-01 VITALS
BODY MASS INDEX: 29.37 KG/M2 | WEIGHT: 209.8 LBS | RESPIRATION RATE: 14 BRPM | HEART RATE: 85 BPM | TEMPERATURE: 97.8 F | OXYGEN SATURATION: 97 % | DIASTOLIC BLOOD PRESSURE: 100 MMHG | SYSTOLIC BLOOD PRESSURE: 134 MMHG | HEIGHT: 71 IN

## 2025-03-01 DIAGNOSIS — J10.1 INFLUENZA A: ICD-10-CM

## 2025-03-01 LAB
FLUAV RNA SPEC QL NAA+PROBE: POSITIVE
FLUBV RNA SPEC QL NAA+PROBE: NEGATIVE
RSV RNA SPEC QL NAA+PROBE: NEGATIVE
S PYO DNA SPEC NAA+PROBE: NOT DETECTED
SARS-COV-2 RNA RESP QL NAA+PROBE: NEGATIVE

## 2025-03-01 PROCEDURE — 87651 STREP A DNA AMP PROBE: CPT | Performed by: FAMILY MEDICINE

## 2025-03-01 PROCEDURE — 99214 OFFICE O/P EST MOD 30 MIN: CPT | Performed by: FAMILY MEDICINE

## 2025-03-01 PROCEDURE — 3080F DIAST BP >= 90 MM HG: CPT | Performed by: FAMILY MEDICINE

## 2025-03-01 PROCEDURE — 3075F SYST BP GE 130 - 139MM HG: CPT | Performed by: FAMILY MEDICINE

## 2025-03-01 PROCEDURE — 0241U POCT CEPHEID COV-2, FLU A/B, RSV - PCR: CPT | Performed by: FAMILY MEDICINE

## 2025-03-01 RX ORDER — OSELTAMIVIR PHOSPHATE 75 MG/1
75 CAPSULE ORAL 2 TIMES DAILY
Qty: 10 CAPSULE | Refills: 0 | Status: SHIPPED | OUTPATIENT
Start: 2025-03-01 | End: 2025-03-06

## 2025-03-01 RX ORDER — FLUTICASONE PROPIONATE 50 MCG
1 SPRAY, SUSPENSION (ML) NASAL DAILY
Qty: 16 G | Refills: 0 | Status: SHIPPED | OUTPATIENT
Start: 2025-03-01 | End: 2025-03-08

## 2025-03-01 RX ORDER — ZOLPIDEM TARTRATE 10 MG/1
10 TABLET ORAL
COMMUNITY
Start: 2025-02-03

## 2025-03-01 RX ORDER — CODEINE PHOSPHATE AND GUAIFENESIN 10; 100 MG/5ML; MG/5ML
5 SOLUTION ORAL EVERY 4 HOURS PRN
Qty: 120 ML | Refills: 0 | Status: SHIPPED | OUTPATIENT
Start: 2025-03-01 | End: 2025-03-08

## 2025-03-01 ASSESSMENT — FIBROSIS 4 INDEX: FIB4 SCORE: 0.64

## 2025-03-01 NOTE — PROGRESS NOTES
"Chief Complaint   Patient presents with    Congestion     X 1 day    Pharyngitis    Cough     Chief Complaint   Patient presents with    Congestion     X 1 day    Pharyngitis    Cough         Cough  This is a new problem. The current episode started yesterday. The problem has been unchanged. The problem occurs constantly. The cough is dry. Associated symptoms include : fatigue, headaches, chills, muscle aches, fever. Pertinent negatives include no   nausea, vomiting, diarrhea, sweats, weight loss or wheezing. Nothing aggravates the symptoms.  Patient has tried nothing for the symptoms. There is no history of asthma.        Past Medical History:   Diagnosis Date    ASTHMA     Heart burn     Insomnia     Neoplasm of uncertain behavior of skin of neck 2/9/2018    Shoulder pain, right          Social History     Tobacco Use    Smoking status: Never    Smokeless tobacco: Never   Vaping Use    Vaping status: Never Used   Substance Use Topics    Alcohol use: Yes     Alcohol/week: 1.0 oz     Types: 2 Cans of beer per week     Comment: 1-2 drinks per day    Drug use: Yes     Comment: marijuana edibles 3-4/month           Family History   Problem Relation Age of Onset    Hyperlipidemia Father     Hypertension Father     Heart Disease Father                     Review of Systems   Constitutional: positive for fever and chills  HENT: negative for otalgia, sore throat  Cardiovascular - denies chest pain or dyspnea  Respiratory: Positive for cough.  .  Negative for wheezing.    Neurological: Negative for headaches, dizziness   GI - denies nausea, vomiting or diarrhea   - denies dysuria, discharge  Psych - denies depression, anxiety  Neuro - denies numbness or tingling.   10 point ROS otherwise negative, except per HPI             Objective:     BP (!) 134/100   Pulse 85   Temp 36.6 °C (97.8 °F) (Temporal)   Resp 14   Ht 1.803 m (5' 11\")   Wt 95.2 kg (209 lb 12.8 oz)   SpO2 97%       Physical Exam   Constitutional: patient " is oriented to person, place, and time. Patient appears well-developed and well-nourished. No distress.   HENT:   Head: Normocephalic and atraumatic.   Right Ear: External ear normal.   Left Ear: External ear normal.   TMs normal  Nose: Mucosal edema  present. Right sinus exhibits no maxillary sinus tenderness. Left sinus exhibits no maxillary sinus tenderness.   Mouth/Throat: Mucous membranes are normal. No oral lesions.  No posterior pharyngeal erythema.  No oropharyngeal exudate or posterior oropharyngeal edema.   Eyes: Conjunctivae and EOM are normal. Pupils are equal, round, and reactive to light. Right eye exhibits no discharge. Left eye exhibits no discharge. No scleral icterus.   Neck: Normal range of motion. Neck supple. No tracheal deviation present.   Cardiovascular: Normal rate, regular rhythm and normal heart sounds.  Exam reveals no friction rub.    Pulmonary/Chest: Effort normal. No respiratory distress. Patient has no wheezes or rhonchi. Patient has no rales.    Musculoskeletal:  exhibits no edema.   Lymphadenopathy:     Patient has no cervical adenopathy.      Neurological: patient is alert and oriented to person, place, and time.   Skin: Skin is warm and dry. No rash noted. No erythema.   Psychiatric: patient  has a normal mood and affect.  behavior is normal.   Nursing note and vitals reviewed.          Assesment/Plan:        1. Influenza A  PCR-confirmed       Rx tamiflu    - fluticasone (FLONASE) 50 MCG/ACT nasal spray; Administer 1 Spray into affected nostril(S) every day for 7 days.  Dispense: 16 g; Refill: 0  - guaifenesin-codeine (ROBITUSSIN AC) Solution oral solution; Take 5 mL by mouth every four hours as needed for Cough for up to 7 days.  Dispense: 120 mL; Refill: 0      Narcotic use report obtained with no indication of narcotic overuse or misuse and deemed necessary for treaatment. Sedation, dependence, and constipation precautions given. Avoid use while driving or operating heavy  machinery.         Differential diagnosis, natural history, supportive care, and indications for immediate follow-up discussed. All questions answered. Patient agrees with the plan of care.     Follow-up as needed if symptoms worsen or fail to improve to PCP, Urgent care or Emergency Room.     I have personally reviewed prior external notes and test results pertinent to today's visit.  I have independently reviewed and interpreted all diagnostics ordered during this urgent care acute visit.

## 2025-03-01 NOTE — LETTER
March 1, 2025    To Whom It May Concern:         This is confirmation that Genaro Kunz attended his scheduled appointment with Horacio Matthews M.D. on 3/01/25.         If you have any questions please do not hesitate to call me at the phone number listed below.    Sincerely,          Horacio Matthews M.D.  [phone]

## 2025-04-19 DIAGNOSIS — I10 ESSENTIAL HYPERTENSION: ICD-10-CM

## 2025-04-21 RX ORDER — AMLODIPINE BESYLATE 5 MG/1
5 TABLET ORAL
Qty: 90 TABLET | Refills: 1 | Status: SHIPPED | OUTPATIENT
Start: 2025-04-21 | End: 2025-04-28 | Stop reason: SDUPTHER

## 2025-04-28 ENCOUNTER — APPOINTMENT (OUTPATIENT)
Dept: MEDICAL GROUP | Facility: MEDICAL CENTER | Age: 50
End: 2025-04-28
Payer: COMMERCIAL

## 2025-04-28 DIAGNOSIS — N52.9 ERECTILE DYSFUNCTION, UNSPECIFIED ERECTILE DYSFUNCTION TYPE: ICD-10-CM

## 2025-04-28 DIAGNOSIS — I10 ESSENTIAL HYPERTENSION: ICD-10-CM

## 2025-04-28 RX ORDER — TADALAFIL 10 MG/1
10 TABLET ORAL PRN
Qty: 30 TABLET | Refills: 3 | Status: SHIPPED | OUTPATIENT
Start: 2025-04-28

## 2025-04-28 RX ORDER — AMLODIPINE BESYLATE 5 MG/1
5 TABLET ORAL
Qty: 90 TABLET | Refills: 1 | Status: SHIPPED | OUTPATIENT
Start: 2025-04-28

## 2025-04-30 ENCOUNTER — OFFICE VISIT (OUTPATIENT)
Dept: MEDICAL GROUP | Facility: MEDICAL CENTER | Age: 50
End: 2025-04-30
Payer: COMMERCIAL

## 2025-04-30 ENCOUNTER — HOSPITAL ENCOUNTER (OUTPATIENT)
Facility: MEDICAL CENTER | Age: 50
End: 2025-04-30
Attending: PHYSICIAN ASSISTANT
Payer: COMMERCIAL

## 2025-04-30 VITALS
DIASTOLIC BLOOD PRESSURE: 88 MMHG | RESPIRATION RATE: 18 BRPM | WEIGHT: 218.31 LBS | TEMPERATURE: 97.6 F | OXYGEN SATURATION: 93 % | HEIGHT: 70 IN | HEART RATE: 85 BPM | BODY MASS INDEX: 31.25 KG/M2 | SYSTOLIC BLOOD PRESSURE: 120 MMHG

## 2025-04-30 DIAGNOSIS — Z12.5 ENCOUNTER FOR SCREENING PROSTATE SPECIFIC ANTIGEN (PSA) MEASUREMENT: ICD-10-CM

## 2025-04-30 DIAGNOSIS — Z51.81 ENCOUNTER FOR MEDICATION MONITORING: ICD-10-CM

## 2025-04-30 DIAGNOSIS — R79.89 LOW VITAMIN D LEVEL: ICD-10-CM

## 2025-04-30 DIAGNOSIS — F51.01 PRIMARY INSOMNIA: ICD-10-CM

## 2025-04-30 DIAGNOSIS — R09.81 NASAL CONGESTION: ICD-10-CM

## 2025-04-30 DIAGNOSIS — Z00.00 PREVENTATIVE HEALTH CARE: ICD-10-CM

## 2025-04-30 LAB
25(OH)D3 SERPL-MCNC: 26 NG/ML (ref 30–100)
ALBUMIN SERPL BCP-MCNC: 4.4 G/DL (ref 3.2–4.9)
ALBUMIN/GLOB SERPL: 1.7 G/DL
ALP SERPL-CCNC: 64 U/L (ref 30–99)
ALT SERPL-CCNC: 31 U/L (ref 2–50)
ANION GAP SERPL CALC-SCNC: 13 MMOL/L (ref 7–16)
AST SERPL-CCNC: 22 U/L (ref 12–45)
BILIRUB SERPL-MCNC: 0.5 MG/DL (ref 0.1–1.5)
BUN SERPL-MCNC: 16 MG/DL (ref 8–22)
CALCIUM ALBUM COR SERPL-MCNC: 9.6 MG/DL (ref 8.5–10.5)
CALCIUM SERPL-MCNC: 9.9 MG/DL (ref 8.5–10.5)
CHLORIDE SERPL-SCNC: 102 MMOL/L (ref 96–112)
CO2 SERPL-SCNC: 25 MMOL/L (ref 20–33)
CREAT SERPL-MCNC: 0.99 MG/DL (ref 0.5–1.4)
ERYTHROCYTE [DISTWIDTH] IN BLOOD BY AUTOMATED COUNT: 44.1 FL (ref 35.9–50)
EST. AVERAGE GLUCOSE BLD GHB EST-MCNC: 120 MG/DL
GFR SERPLBLD CREATININE-BSD FMLA CKD-EPI: 92 ML/MIN/1.73 M 2
GLOBULIN SER CALC-MCNC: 2.6 G/DL (ref 1.9–3.5)
GLUCOSE SERPL-MCNC: 105 MG/DL (ref 65–99)
HBA1C MFR BLD: 5.8 % (ref 4–5.6)
HCT VFR BLD AUTO: 48.6 % (ref 42–52)
HGB BLD-MCNC: 16.2 G/DL (ref 14–18)
MCH RBC QN AUTO: 31.1 PG (ref 27–33)
MCHC RBC AUTO-ENTMCNC: 33.3 G/DL (ref 32.3–36.5)
MCV RBC AUTO: 93.3 FL (ref 81.4–97.8)
PLATELET # BLD AUTO: 296 K/UL (ref 164–446)
PMV BLD AUTO: 10 FL (ref 9–12.9)
POTASSIUM SERPL-SCNC: 4.3 MMOL/L (ref 3.6–5.5)
PROT SERPL-MCNC: 7 G/DL (ref 6–8.2)
PSA SERPL-MCNC: 0.69 NG/ML (ref 0–4)
RBC # BLD AUTO: 5.21 M/UL (ref 4.7–6.1)
SODIUM SERPL-SCNC: 140 MMOL/L (ref 135–145)
TSH SERPL DL<=0.005 MIU/L-ACNC: 2.58 UIU/ML (ref 0.38–5.33)
WBC # BLD AUTO: 8.9 K/UL (ref 4.8–10.8)

## 2025-04-30 PROCEDURE — 83036 HEMOGLOBIN GLYCOSYLATED A1C: CPT

## 2025-04-30 PROCEDURE — 85027 COMPLETE CBC AUTOMATED: CPT

## 2025-04-30 PROCEDURE — 36415 COLL VENOUS BLD VENIPUNCTURE: CPT

## 2025-04-30 PROCEDURE — 84443 ASSAY THYROID STIM HORMONE: CPT

## 2025-04-30 PROCEDURE — 80307 DRUG TEST PRSMV CHEM ANLYZR: CPT

## 2025-04-30 PROCEDURE — 82306 VITAMIN D 25 HYDROXY: CPT

## 2025-04-30 PROCEDURE — 80053 COMPREHEN METABOLIC PANEL: CPT

## 2025-04-30 PROCEDURE — 84153 ASSAY OF PSA TOTAL: CPT

## 2025-04-30 RX ORDER — ZOLPIDEM TARTRATE 10 MG/1
10 TABLET ORAL NIGHTLY
Qty: 30 TABLET | Refills: 5 | Status: SHIPPED | OUTPATIENT
Start: 2025-05-08 | End: 2025-06-07

## 2025-04-30 ASSESSMENT — FIBROSIS 4 INDEX: FIB4 SCORE: 0.65

## 2025-04-30 NOTE — PROGRESS NOTES
"Subjective:     History of Present Illness  The patient presents for evaluation of sinus issues and sleep management.    Intermittent sinus issues have been experienced for the past year, with evaluations conducted at an urgent care facility. Prednisone was prescribed, providing relief for approximately 2 weeks. Additionally, care was sought from a telemetry physician due to associated migraines that have persisted for 6 months. Symptoms typically manifest in the morning and at night. Flonase and Claritin have been used intermittently, providing some relief.    Ambien is taken for sleep management, reported as effective. This is supplemented with Benadryl 25 mg taken 30 minutes prior to Ambien administration.    A recent refill of the amlodipine prescription was obtained, and Cialis continues to be taken.    SOCIAL HISTORY  He reports no illicit drug use.      Current medicines (including changes today)  Current Outpatient Medications   Medication Sig Dispense Refill    [START ON 5/8/2025] zolpidem (AMBIEN) 10 MG Tab Take 1 Tablet by mouth every evening for 30 days. For up to 30 days 30 Tablet 5    tadalafil (CIALIS) 10 MG tablet Take 1 Tablet by mouth as needed for Erectile Dysfunction. 30 Tablet 3    amLODIPine (NORVASC) 5 MG Tab Take 1 Tablet by mouth every day. 90 Tablet 1     No current facility-administered medications for this visit.     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    ROS   No chest pain, no shortness of breath, no abdominal pain  Positive ROS as per HPI.  All other systems reviewed and are negative.     Objective:     /88 (BP Location: Right arm, Patient Position: Sitting, BP Cuff Size: Adult)   Pulse 85   Temp 36.4 °C (97.6 °F) (Temporal)   Resp 18   Ht 1.778 m (5' 10\")   Wt 99 kg (218 lb 5 oz)   SpO2 93%  Body mass index is 31.32 kg/m².   Physical Exam    Constitutional: " Alert, no distress.  Skin: Warm, dry, good turgor, no rashes in visible areas.  Eye: Equal, round and reactive, conjunctiva clear, lids normal.  ENMT: Lips without lesions, good dentition, oropharynx clear.  Neck: Trachea midline, no masses, no thyromegaly.   Psych: Alert and oriented x3, normal affect and mood.      Results          Assessment and Plan:   The following treatment plan was discussed    Assessment & Plan  1. Sinusitis.  Chronic, intermittent.  - Reports ongoing sinus issues for the past year, with intermittent relief from prednisone provided by urgent care.  - Using Flonase and Claritin with varying success.  - Advised to continue using Flonase daily before bedtime to manage symptoms.  - Symptoms have been fluctuating, with recent improvement noted.    2.  Insomnia.   reviewed.  Agreement is up-to-date.  - Currently taking Ambien 10 mg for sleep, supplemented with Benadryl 25 mg taken 30 minutes prior.  - Reports this regimen is effective.  - Prescription for Ambien 10 mg, 30 tablets with 5 refills, will be renewed and sent to [facility].  - Follow-up appointment scheduled in 6 months.  - Ordered urine drug screen to be performed today.    3. Medication management.  - Confirms taking amlodipine and Cialis as prescribed.  - No changes to these medications are necessary at this time.  - Medication adherence discussed and confirmed.    4. Health maintenance.  - Urine drug screen will be ordered today to ensure compliance with prescribed medications and to check for any illicit substances.  - Non-fasting labs including complete blood count, liver function, kidney function, A1c, thyroid level, and PSA will be ordered as part of routine health maintenance.  - Patient instructed to complete the lab work today.  - Deferred cholesterol profile as he is not fasting currently.      ORDERS:  1. Nasal congestion      2. Encounter for medication monitoring    - URINE DRUG SCREEN W/CONF (SEND OUT); Future    3. Primary  insomnia    - URINE DRUG SCREEN W/CONF (SEND OUT); Future  - zolpidem (AMBIEN) 10 MG Tab; Take 1 Tablet by mouth every evening for 30 days. For up to 30 days  Dispense: 30 Tablet; Refill: 5    4. Preventative health care    - CBC WITHOUT DIFFERENTIAL; Future  - Comp Metabolic Panel; Future  - HEMOGLOBIN A1C; Future  - VITAMIN D,25 HYDROXY (DEFICIENCY); Future  - TSH WITH REFLEX TO FT4; Future    5. Encounter for screening prostate specific antigen (PSA) measurement    - PROSTATE SPECIFIC AG SCREENING; Future    6. Low vitamin D level    - VITAMIN D,25 HYDROXY (DEFICIENCY); Future    () Today's E/M visit is associated with medical care services that serve as the continuing focal point for all needed health care services and/or with medical care services that are part of ongoing care related to a patient's single, serious condition or a complex condition: This includes furnishing services to patients on an ongoing basis that result in care that is personalized to the patient. The services result in a comprehensive, longitudinal, and continuous relationship with the patient and involve delivery of team-based care that is accessible, coordinated with other practitioners and providers, and integrated with the broader health care landscape.     Please note that this dictation was created using voice recognition software. I have made every reasonable attempt to correct obvious errors, but I expect that there are errors of grammar and possibly content that I did not discover before finalizing the note.      Attestation      Verbal consent was acquired by the patient to use [employer] ambient listening note generation during this visit Yes

## 2025-05-01 LAB
AMPHET CTO UR CFM-MCNC: NEGATIVE NG/ML
BARBITURATES CTO UR CFM-MCNC: NEGATIVE NG/ML
BENZODIAZ CTO UR CFM-MCNC: NEGATIVE NG/ML
CANNABINOIDS CTO UR CFM-MCNC: NEGATIVE NG/ML
COCAINE CTO UR CFM-MCNC: NEGATIVE NG/ML
CREAT UR-MCNC: 122.8 MG/DL (ref 20–400)
DRUG COMMENT 753798: NORMAL
METHADONE CTO UR CFM-MCNC: NEGATIVE NG/ML
OPIATES CTO UR CFM-MCNC: NEGATIVE NG/ML
PCP CTO UR CFM-MCNC: NEGATIVE NG/ML
PROPOXYPH CTO UR CFM-MCNC: NEGATIVE NG/ML

## 2025-05-02 ENCOUNTER — RESULTS FOLLOW-UP (OUTPATIENT)
Dept: MEDICAL GROUP | Facility: MEDICAL CENTER | Age: 50
End: 2025-05-02

## 2025-05-24 DIAGNOSIS — N52.9 ERECTILE DYSFUNCTION, UNSPECIFIED ERECTILE DYSFUNCTION TYPE: ICD-10-CM

## 2025-05-26 RX ORDER — TADALAFIL 10 MG/1
10 TABLET ORAL PRN
Qty: 24 TABLET | Refills: 4 | Status: SHIPPED | OUTPATIENT
Start: 2025-05-26

## 2025-07-23 ENCOUNTER — PATIENT MESSAGE (OUTPATIENT)
Dept: MEDICAL GROUP | Facility: MEDICAL CENTER | Age: 50
End: 2025-07-23
Payer: COMMERCIAL

## 2025-07-23 DIAGNOSIS — F51.01 PRIMARY INSOMNIA: ICD-10-CM

## 2025-07-24 RX ORDER — ZOLPIDEM TARTRATE 10 MG/1
10 TABLET ORAL NIGHTLY
Qty: 30 TABLET | Refills: 5 | Status: SHIPPED | OUTPATIENT
Start: 2025-07-24 | End: 2025-08-23

## (undated) DEVICE — NEEDLE DAVIS TONSIL 1/2 CIR - (2EA/PK20PK/BX)

## (undated) DEVICE — CANNULA THREADED 5X75 (5EA/BX)

## (undated) DEVICE — CANNULA, GATEWAY

## (undated) DEVICE — SODIUM CHL IRRIGATION 0.9% 1000ML (12EA/CA)

## (undated) DEVICE — KIT ROOM DECONTAMINATION

## (undated) DEVICE — STYLETTE 6FR INFANT STERILE SINGEL ALUMINUM SUNSLIP SEALED IN PVC SHEATH (20EA/BX)

## (undated) DEVICE — PACK SHOULDER ARTHROSCOPY SM - (2EA/CA)

## (undated) DEVICE — GOWN WARMING STANDARD FLEX - (30/CA)

## (undated) DEVICE — SUCTION INSTRUMENT YANKAUER BULBOUS TIP W/O VENT (50EA/CA)

## (undated) DEVICE — SENSOR SPO2 NEO LNCS ADHESIVE (20/BX) SEE USER NOTES

## (undated) DEVICE — SHAVER4.0 AGGRESSIVE + FORMLA (5EA/BX)

## (undated) DEVICE — HUMID-VENT HEAT AND MOISTURE EXCHANGE- (50/BX)

## (undated) DEVICE — ELECTRODE 850 FOAM ADHESIVE - HYDROGEL RADIOTRNSPRNT (50/PK)

## (undated) DEVICE — BIT DRILL ICONIX 1.4 (5EA/BX)

## (undated) DEVICE — TAPE XBRAID TT 2.0MM (12EA/BX)

## (undated) DEVICE — NEPTUNE 4 PORT MANIFOLD - (20/PK)

## (undated) DEVICE — DRAPE LARGE 3 QUARTER - (20/CA)

## (undated) DEVICE — SUTURE LASSO CORKSCREW LT

## (undated) DEVICE — HEAD HOLDER JUNIOR/ADULT

## (undated) DEVICE — CANNULA TWIST IN 8.25MM X 7CM (5/BX)

## (undated) DEVICE — SHAVER 5.5 RESECTOR FORMULA (5EA/BX )

## (undated) DEVICE — CHLORAPREP 26 ML APPLICATOR - ORANGE TINT(25/CA)

## (undated) DEVICE — TUBE CONNECTING SUCTION - CLEAR PLASTIC STERILE 72 IN (50EA/CA)

## (undated) DEVICE — TUBING PUMP WITH CONNECTOR REDEUCE (1EA)

## (undated) DEVICE — PROTECTOR ULNA NERVE - (36PR/CA)

## (undated) DEVICE — ELECTRODE DUAL RETURN W/ CORD - (50/PK)

## (undated) DEVICE — TUBING PATIENT W/CONNECTOR REDEUCE (1EA)

## (undated) DEVICE — KIT ANESTHESIA W/CIRCUIT & 3/LT BAG W/FILTER (20EA/CA)

## (undated) DEVICE — GLOVE PROTEXIS PI MICRO SZ 8.5 (200PR/CA)

## (undated) DEVICE — GLOVE BIOGEL INDICATOR SZ 9 SURGICAL PF LTX - (160/CA)

## (undated) DEVICE — GLOVE, LITE (PAIR)

## (undated) DEVICE — SUTURE GENERAL

## (undated) DEVICE — SYRINGE 30 ML LL (56/BX)

## (undated) DEVICE — DRAPE SHOULDER FLUID CONTROL - 77 X 85 (10/CA)

## (undated) DEVICE — SUTURE LASSO CORKSCREW RT

## (undated) DEVICE — NEEDLE EXPRESSEW III (5EA/PK)

## (undated) DEVICE — BAG, SPONGE COUNT 50600

## (undated) DEVICE — SHAVER 4.0 ROUND FORMULA (5EA/BX)

## (undated) DEVICE — CLOSURE SKIN STRIP 1/2 X 4 IN - (STERI STRIP) (50/BX 4BX/CA)

## (undated) DEVICE — SUTURE 4-0 ETHILON FS-2 18 (36PK/BX)"

## (undated) DEVICE — CANISTER SUCTION RIGID RED 1500CC (40EA/CA)

## (undated) DEVICE — SUTURE 3-0 ETHILON FS-1 - (36/BX) 30 INCH

## (undated) DEVICE — SUTURE 2-0 VICRYL PLUS CT-2 - 27 INCH (36/BX)

## (undated) DEVICE — BLOCK

## (undated) DEVICE — MASK ANESTHESIA ADULT  - (100/CA)

## (undated) DEVICE — SODIUM CHL. IRRIGATION 0.9% 3000ML (4EA/CA 65CA/PF)

## (undated) DEVICE — WATER IRRIGATION STERILE 1000ML (12EA/CA)

## (undated) DEVICE — SLEEVE SHOULDER DISP(ARTHREX) - (6/BX)

## (undated) DEVICE — ABLATOR WAND SERFAS 90-S CRUISE